# Patient Record
Sex: FEMALE | Race: WHITE | Employment: OTHER | URBAN - METROPOLITAN AREA
[De-identification: names, ages, dates, MRNs, and addresses within clinical notes are randomized per-mention and may not be internally consistent; named-entity substitution may affect disease eponyms.]

---

## 2018-03-29 ENCOUNTER — RECORDS - HEALTHEAST (OUTPATIENT)
Dept: LAB | Facility: CLINIC | Age: 83
End: 2018-03-29

## 2018-03-29 LAB
ALBUMIN UR-MCNC: NEGATIVE MG/DL
APPEARANCE UR: CLEAR
BACTERIA #/AREA URNS HPF: ABNORMAL HPF
BILIRUB UR QL STRIP: NEGATIVE
COLOR UR AUTO: YELLOW
GLUCOSE UR STRIP-MCNC: NEGATIVE MG/DL
HGB UR QL STRIP: ABNORMAL
KETONES UR STRIP-MCNC: NEGATIVE MG/DL
LEUKOCYTE ESTERASE UR QL STRIP: ABNORMAL
MUCOUS THREADS #/AREA URNS LPF: ABNORMAL LPF
NITRATE UR QL: NEGATIVE
PH UR STRIP: 5.5 [PH] (ref 4.5–8)
RBC #/AREA URNS AUTO: ABNORMAL HPF
SP GR UR STRIP: 1.01 (ref 1–1.03)
SQUAMOUS #/AREA URNS AUTO: ABNORMAL LPF
UROBILINOGEN UR STRIP-ACNC: ABNORMAL
WBC #/AREA URNS AUTO: ABNORMAL HPF

## 2018-03-30 LAB — BACTERIA SPEC CULT: NO GROWTH

## 2018-06-13 ENCOUNTER — RECORDS - HEALTHEAST (OUTPATIENT)
Dept: LAB | Facility: CLINIC | Age: 83
End: 2018-06-13

## 2018-06-13 LAB
25(OH)D3 SERPL-MCNC: 45.4 NG/ML (ref 30–80)
ANION GAP SERPL CALCULATED.3IONS-SCNC: 7 MMOL/L (ref 5–18)
BUN SERPL-MCNC: 17 MG/DL (ref 8–28)
CALCIUM SERPL-MCNC: 9.9 MG/DL (ref 8.5–10.5)
CHLORIDE BLD-SCNC: 104 MMOL/L (ref 98–107)
CO2 SERPL-SCNC: 27 MMOL/L (ref 22–31)
CREAT SERPL-MCNC: 0.93 MG/DL (ref 0.6–1.1)
ERYTHROCYTE [DISTWIDTH] IN BLOOD BY AUTOMATED COUNT: 12.5 % (ref 11–14.5)
GFR SERPL CREATININE-BSD FRML MDRD: 57 ML/MIN/1.73M2
GLUCOSE BLD-MCNC: 95 MG/DL (ref 70–125)
HCT VFR BLD AUTO: 36.1 % (ref 35–47)
HGB BLD-MCNC: 11.7 G/DL (ref 12–16)
MCH RBC QN AUTO: 33 PG (ref 27–34)
MCHC RBC AUTO-ENTMCNC: 32.4 G/DL (ref 32–36)
MCV RBC AUTO: 102 FL (ref 80–100)
PLATELET # BLD AUTO: 248 THOU/UL (ref 140–440)
PMV BLD AUTO: 9.7 FL (ref 8.5–12.5)
POTASSIUM BLD-SCNC: 4.4 MMOL/L (ref 3.5–5)
RBC # BLD AUTO: 3.55 MILL/UL (ref 3.8–5.4)
SODIUM SERPL-SCNC: 138 MMOL/L (ref 136–145)
TSH SERPL DL<=0.005 MIU/L-ACNC: 2.91 UIU/ML (ref 0.3–5)
WBC: 5.2 THOU/UL (ref 4–11)

## 2018-08-11 ENCOUNTER — APPOINTMENT (OUTPATIENT)
Dept: GENERAL RADIOLOGY | Facility: CLINIC | Age: 83
DRG: 200 | End: 2018-08-11
Attending: EMERGENCY MEDICINE
Payer: MEDICARE

## 2018-08-11 ENCOUNTER — APPOINTMENT (OUTPATIENT)
Dept: CT IMAGING | Facility: CLINIC | Age: 83
DRG: 200 | End: 2018-08-11
Attending: EMERGENCY MEDICINE
Payer: MEDICARE

## 2018-08-11 ENCOUNTER — HOSPITAL ENCOUNTER (INPATIENT)
Facility: CLINIC | Age: 83
LOS: 4 days | Discharge: HOME HEALTH | DRG: 200 | End: 2018-08-15
Attending: EMERGENCY MEDICINE | Admitting: SURGERY
Payer: MEDICARE

## 2018-08-11 DIAGNOSIS — S27.0XXA TRAUMATIC PNEUMOTHORAX, INITIAL ENCOUNTER: ICD-10-CM

## 2018-08-11 DIAGNOSIS — I10 ESSENTIAL HYPERTENSION: ICD-10-CM

## 2018-08-11 DIAGNOSIS — S22.41XA CLOSED FRACTURE OF MULTIPLE RIBS OF RIGHT SIDE, INITIAL ENCOUNTER: ICD-10-CM

## 2018-08-11 DIAGNOSIS — B02.8 HERPES ZOSTER DERMATITIS: Primary | ICD-10-CM

## 2018-08-11 DIAGNOSIS — L30.8 HERPES ZOSTER DERMATITIS: Primary | ICD-10-CM

## 2018-08-11 PROBLEM — J93.83 PNEUMOTHORAX, ACUTE: Status: ACTIVE | Noted: 2018-08-11

## 2018-08-11 LAB
ALBUMIN UR-MCNC: NEGATIVE MG/DL
ANION GAP SERPL CALCULATED.3IONS-SCNC: 6 MMOL/L (ref 3–14)
APPEARANCE UR: CLEAR
BACTERIA #/AREA URNS HPF: ABNORMAL /HPF
BASOPHILS # BLD AUTO: 0.1 10E9/L (ref 0–0.2)
BASOPHILS NFR BLD AUTO: 0.4 %
BILIRUB UR QL STRIP: NEGATIVE
BUN SERPL-MCNC: 21 MG/DL (ref 7–30)
CALCIUM SERPL-MCNC: 9 MG/DL (ref 8.5–10.1)
CHLORIDE SERPL-SCNC: 103 MMOL/L (ref 94–109)
CO2 SERPL-SCNC: 26 MMOL/L (ref 20–32)
COLOR UR AUTO: YELLOW
CREAT SERPL-MCNC: 0.96 MG/DL (ref 0.52–1.04)
DIFFERENTIAL METHOD BLD: ABNORMAL
EOSINOPHIL # BLD AUTO: 0.1 10E9/L (ref 0–0.7)
EOSINOPHIL NFR BLD AUTO: 0.4 %
ERYTHROCYTE [DISTWIDTH] IN BLOOD BY AUTOMATED COUNT: 12.5 % (ref 10–15)
GFR SERPL CREATININE-BSD FRML MDRD: 54 ML/MIN/1.7M2
GLUCOSE SERPL-MCNC: 107 MG/DL (ref 70–99)
GLUCOSE UR STRIP-MCNC: NEGATIVE MG/DL
HCT VFR BLD AUTO: 42.4 % (ref 35–47)
HGB BLD-MCNC: 13.5 G/DL (ref 11.7–15.7)
HGB UR QL STRIP: ABNORMAL
HYALINE CASTS #/AREA URNS LPF: 1 /LPF (ref 0–2)
IMM GRANULOCYTES # BLD: 0.1 10E9/L (ref 0–0.4)
IMM GRANULOCYTES NFR BLD: 0.6 %
INR PPP: 0.92 (ref 0.86–1.14)
KETONES UR STRIP-MCNC: NEGATIVE MG/DL
LEUKOCYTE ESTERASE UR QL STRIP: ABNORMAL
LYMPHOCYTES # BLD AUTO: 0.9 10E9/L (ref 0.8–5.3)
LYMPHOCYTES NFR BLD AUTO: 6.7 %
MCH RBC QN AUTO: 33.3 PG (ref 26.5–33)
MCHC RBC AUTO-ENTMCNC: 31.8 G/DL (ref 31.5–36.5)
MCV RBC AUTO: 105 FL (ref 78–100)
MONOCYTES # BLD AUTO: 0.7 10E9/L (ref 0–1.3)
MONOCYTES NFR BLD AUTO: 5.1 %
NEUTROPHILS # BLD AUTO: 11.6 10E9/L (ref 1.6–8.3)
NEUTROPHILS NFR BLD AUTO: 86.8 %
NITRATE UR QL: NEGATIVE
NRBC # BLD AUTO: 0 10*3/UL
NRBC BLD AUTO-RTO: 0 /100
PH UR STRIP: 6 PH (ref 5–7)
PLATELET # BLD AUTO: 265 10E9/L (ref 150–450)
POTASSIUM SERPL-SCNC: 4.1 MMOL/L (ref 3.4–5.3)
RBC # BLD AUTO: 4.05 10E12/L (ref 3.8–5.2)
RBC #/AREA URNS AUTO: 3 /HPF (ref 0–2)
SODIUM SERPL-SCNC: 135 MMOL/L (ref 133–144)
SOURCE: ABNORMAL
SP GR UR STRIP: 1.01 (ref 1–1.03)
SQUAMOUS #/AREA URNS AUTO: <1 /HPF (ref 0–1)
UROBILINOGEN UR STRIP-MCNC: 0 MG/DL (ref 0–2)
WBC # BLD AUTO: 13.4 10E9/L (ref 4–11)
WBC #/AREA URNS AUTO: 1 /HPF (ref 0–5)

## 2018-08-11 PROCEDURE — 40000275 ZZH STATISTIC RCP TIME EA 10 MIN

## 2018-08-11 PROCEDURE — 71046 X-RAY EXAM CHEST 2 VIEWS: CPT

## 2018-08-11 PROCEDURE — A9270 NON-COVERED ITEM OR SERVICE: HCPCS | Mod: GY | Performed by: SURGERY

## 2018-08-11 PROCEDURE — 40000965 ZZH STATISTIC END TITIAL CO2 MONITORING

## 2018-08-11 PROCEDURE — 80048 BASIC METABOLIC PNL TOTAL CA: CPT | Performed by: EMERGENCY MEDICINE

## 2018-08-11 PROCEDURE — 81001 URINALYSIS AUTO W/SCOPE: CPT | Performed by: INTERNAL MEDICINE

## 2018-08-11 PROCEDURE — 27210205 ZZH KIT THORACIC/CHEST DRAIN

## 2018-08-11 PROCEDURE — 96375 TX/PRO/DX INJ NEW DRUG ADDON: CPT

## 2018-08-11 PROCEDURE — 99221 1ST HOSP IP/OBS SF/LOW 40: CPT | Performed by: SURGERY

## 2018-08-11 PROCEDURE — 99153 MOD SED SAME PHYS/QHP EA: CPT

## 2018-08-11 PROCEDURE — 96374 THER/PROPH/DIAG INJ IV PUSH: CPT

## 2018-08-11 PROCEDURE — 70450 CT HEAD/BRAIN W/O DYE: CPT

## 2018-08-11 PROCEDURE — A9270 NON-COVERED ITEM OR SERVICE: HCPCS | Mod: GY | Performed by: INTERNAL MEDICINE

## 2018-08-11 PROCEDURE — 25000128 H RX IP 250 OP 636: Performed by: EMERGENCY MEDICINE

## 2018-08-11 PROCEDURE — 99285 EMERGENCY DEPT VISIT HI MDM: CPT | Mod: 25

## 2018-08-11 PROCEDURE — 0W9930Z DRAINAGE OF RIGHT PLEURAL CAVITY WITH DRAINAGE DEVICE, PERCUTANEOUS APPROACH: ICD-10-PCS | Performed by: EMERGENCY MEDICINE

## 2018-08-11 PROCEDURE — 71250 CT THORAX DX C-: CPT

## 2018-08-11 PROCEDURE — 12000000 ZZH R&B MED SURG/OB

## 2018-08-11 PROCEDURE — 85610 PROTHROMBIN TIME: CPT | Performed by: EMERGENCY MEDICINE

## 2018-08-11 PROCEDURE — 72125 CT NECK SPINE W/O DYE: CPT

## 2018-08-11 PROCEDURE — 40000986 XR CHEST PORT 1 VW

## 2018-08-11 PROCEDURE — 85025 COMPLETE CBC W/AUTO DIFF WBC: CPT | Performed by: EMERGENCY MEDICINE

## 2018-08-11 PROCEDURE — 25000132 ZZH RX MED GY IP 250 OP 250 PS 637: Mod: GY | Performed by: SURGERY

## 2018-08-11 PROCEDURE — 99221 1ST HOSP IP/OBS SF/LOW 40: CPT | Performed by: INTERNAL MEDICINE

## 2018-08-11 PROCEDURE — 40000914 ZZH STATISTIC SITTER, DAY HOURS

## 2018-08-11 PROCEDURE — 40000916 ZZH STATISTIC SITTER, NIGHT HOURS

## 2018-08-11 PROCEDURE — 32551 INSERTION OF CHEST TUBE: CPT

## 2018-08-11 PROCEDURE — 99207 ZZC CONSULT E&M CHANGED TO INITIAL LEVEL: CPT | Performed by: INTERNAL MEDICINE

## 2018-08-11 PROCEDURE — 25000132 ZZH RX MED GY IP 250 OP 250 PS 637: Mod: GY | Performed by: INTERNAL MEDICINE

## 2018-08-11 PROCEDURE — 72128 CT CHEST SPINE W/O DYE: CPT

## 2018-08-11 PROCEDURE — 96376 TX/PRO/DX INJ SAME DRUG ADON: CPT

## 2018-08-11 PROCEDURE — 40000915 ZZH STATISTIC SITTER, EVENING HOURS

## 2018-08-11 PROCEDURE — 99152 MOD SED SAME PHYS/QHP 5/>YRS: CPT

## 2018-08-11 RX ORDER — LOSARTAN POTASSIUM 50 MG/1
50 TABLET ORAL DAILY
Status: DISCONTINUED | OUTPATIENT
Start: 2018-08-11 | End: 2018-08-15 | Stop reason: HOSPADM

## 2018-08-11 RX ORDER — HALOPERIDOL 5 MG/ML
2 INJECTION INTRAMUSCULAR ONCE
Status: COMPLETED | OUTPATIENT
Start: 2018-08-11 | End: 2018-08-11

## 2018-08-11 RX ORDER — POLYETHYLENE GLYCOL 3350 17 G/17G
17 POWDER, FOR SOLUTION ORAL DAILY
Status: DISCONTINUED | OUTPATIENT
Start: 2018-08-11 | End: 2018-08-15 | Stop reason: HOSPADM

## 2018-08-11 RX ORDER — BISACODYL 5 MG
5 TABLET, DELAYED RELEASE (ENTERIC COATED) ORAL DAILY PRN
Status: DISCONTINUED | OUTPATIENT
Start: 2018-08-11 | End: 2018-08-15 | Stop reason: HOSPADM

## 2018-08-11 RX ORDER — BISACODYL 5 MG
10 TABLET, DELAYED RELEASE (ENTERIC COATED) ORAL DAILY PRN
Status: DISCONTINUED | OUTPATIENT
Start: 2018-08-11 | End: 2018-08-15 | Stop reason: HOSPADM

## 2018-08-11 RX ORDER — PROCHLORPERAZINE 25 MG
12.5 SUPPOSITORY, RECTAL RECTAL EVERY 12 HOURS PRN
Status: DISCONTINUED | OUTPATIENT
Start: 2018-08-11 | End: 2018-08-15 | Stop reason: HOSPADM

## 2018-08-11 RX ORDER — ONDANSETRON 2 MG/ML
4 INJECTION INTRAMUSCULAR; INTRAVENOUS ONCE
Status: COMPLETED | OUTPATIENT
Start: 2018-08-11 | End: 2018-08-11

## 2018-08-11 RX ORDER — POTASSIUM CHLORIDE 1500 MG/1
20-40 TABLET, EXTENDED RELEASE ORAL
Status: DISCONTINUED | OUTPATIENT
Start: 2018-08-11 | End: 2018-08-15 | Stop reason: HOSPADM

## 2018-08-11 RX ORDER — POLYETHYLENE GLYCOL 3350 17 G/17G
17 POWDER, FOR SOLUTION ORAL DAILY PRN
Status: DISCONTINUED | OUTPATIENT
Start: 2018-08-11 | End: 2018-08-15 | Stop reason: HOSPADM

## 2018-08-11 RX ORDER — PROCHLORPERAZINE MALEATE 5 MG
5 TABLET ORAL EVERY 6 HOURS PRN
Status: DISCONTINUED | OUTPATIENT
Start: 2018-08-11 | End: 2018-08-15 | Stop reason: HOSPADM

## 2018-08-11 RX ORDER — OXYCODONE HYDROCHLORIDE 5 MG/1
5-10 TABLET ORAL
Status: DISCONTINUED | OUTPATIENT
Start: 2018-08-11 | End: 2018-08-15 | Stop reason: HOSPADM

## 2018-08-11 RX ORDER — MORPHINE SULFATE 4 MG/ML
4 INJECTION, SOLUTION INTRAMUSCULAR; INTRAVENOUS ONCE
Status: COMPLETED | OUTPATIENT
Start: 2018-08-11 | End: 2018-08-11

## 2018-08-11 RX ORDER — SERTRALINE HYDROCHLORIDE 100 MG/1
100 TABLET, FILM COATED ORAL DAILY
Status: DISCONTINUED | OUTPATIENT
Start: 2018-08-11 | End: 2018-08-15 | Stop reason: HOSPADM

## 2018-08-11 RX ORDER — ALUMINA, MAGNESIA, AND SIMETHICONE 2400; 2400; 240 MG/30ML; MG/30ML; MG/30ML
30 SUSPENSION ORAL EVERY 4 HOURS PRN
Status: DISCONTINUED | OUTPATIENT
Start: 2018-08-11 | End: 2018-08-15 | Stop reason: HOSPADM

## 2018-08-11 RX ORDER — LIDOCAINE 40 MG/G
CREAM TOPICAL
Status: DISCONTINUED | OUTPATIENT
Start: 2018-08-11 | End: 2018-08-15 | Stop reason: HOSPADM

## 2018-08-11 RX ORDER — LEVOTHYROXINE SODIUM 75 UG/1
75 TABLET ORAL DAILY
COMMUNITY

## 2018-08-11 RX ORDER — NALOXONE HYDROCHLORIDE 0.4 MG/ML
.1-.4 INJECTION, SOLUTION INTRAMUSCULAR; INTRAVENOUS; SUBCUTANEOUS
Status: DISCONTINUED | OUTPATIENT
Start: 2018-08-11 | End: 2018-08-15 | Stop reason: HOSPADM

## 2018-08-11 RX ORDER — MAGNESIUM SULFATE HEPTAHYDRATE 40 MG/ML
4 INJECTION, SOLUTION INTRAVENOUS EVERY 4 HOURS PRN
Status: DISCONTINUED | OUTPATIENT
Start: 2018-08-11 | End: 2018-08-15 | Stop reason: HOSPADM

## 2018-08-11 RX ORDER — LANOLIN ALCOHOL/MO/W.PET/CERES
CREAM (GRAM) TOPICAL 2 TIMES DAILY
COMMUNITY

## 2018-08-11 RX ORDER — SERTRALINE HYDROCHLORIDE 100 MG/1
100 TABLET, FILM COATED ORAL DAILY
COMMUNITY

## 2018-08-11 RX ORDER — POLYETHYLENE GLYCOL 3350 17 G/17G
1 POWDER, FOR SOLUTION ORAL DAILY
COMMUNITY

## 2018-08-11 RX ORDER — ONDANSETRON 4 MG/1
4 TABLET, ORALLY DISINTEGRATING ORAL EVERY 6 HOURS PRN
Status: DISCONTINUED | OUTPATIENT
Start: 2018-08-11 | End: 2018-08-15 | Stop reason: HOSPADM

## 2018-08-11 RX ORDER — CHOLECALCIFEROL (VITAMIN D3) 50 MCG
1 TABLET ORAL DAILY
COMMUNITY

## 2018-08-11 RX ORDER — OLOPATADINE HYDROCHLORIDE 2 MG/ML
1 SOLUTION/ DROPS OPHTHALMIC PRN
COMMUNITY

## 2018-08-11 RX ORDER — AMOXICILLIN 250 MG
2 CAPSULE ORAL 2 TIMES DAILY
Status: DISCONTINUED | OUTPATIENT
Start: 2018-08-11 | End: 2018-08-15 | Stop reason: HOSPADM

## 2018-08-11 RX ORDER — POTASSIUM CHLORIDE 1.5 G/1.58G
20-40 POWDER, FOR SOLUTION ORAL
Status: DISCONTINUED | OUTPATIENT
Start: 2018-08-11 | End: 2018-08-15 | Stop reason: HOSPADM

## 2018-08-11 RX ORDER — ACETAMINOPHEN 325 MG/1
650 TABLET ORAL EVERY 4 HOURS PRN
Status: DISCONTINUED | OUTPATIENT
Start: 2018-08-11 | End: 2018-08-15 | Stop reason: HOSPADM

## 2018-08-11 RX ORDER — LEVOTHYROXINE SODIUM 75 UG/1
75 TABLET ORAL DAILY
Status: DISCONTINUED | OUTPATIENT
Start: 2018-08-11 | End: 2018-08-15 | Stop reason: HOSPADM

## 2018-08-11 RX ORDER — MORPHINE SULFATE 4 MG/ML
4 INJECTION, SOLUTION INTRAMUSCULAR; INTRAVENOUS
Status: COMPLETED | OUTPATIENT
Start: 2018-08-11 | End: 2018-08-11

## 2018-08-11 RX ORDER — POTASSIUM CHLORIDE 7.45 MG/ML
10 INJECTION INTRAVENOUS
Status: DISCONTINUED | OUTPATIENT
Start: 2018-08-11 | End: 2018-08-15 | Stop reason: HOSPADM

## 2018-08-11 RX ORDER — POLYETHYLENE GLYCOL 3350 17 G/17G
1 POWDER, FOR SOLUTION ORAL PRN
COMMUNITY

## 2018-08-11 RX ORDER — LIDOCAINE HYDROCHLORIDE 20 MG/ML
INJECTION, SOLUTION INFILTRATION; PERINEURAL
Status: DISCONTINUED
Start: 2018-08-11 | End: 2018-08-11 | Stop reason: HOSPADM

## 2018-08-11 RX ORDER — GUAIFENESIN AND DEXTROMETHORPHAN HYDROBROMIDE 100; 10 MG/5ML; MG/5ML
10 SOLUTION ORAL EVERY 4 HOURS PRN
COMMUNITY

## 2018-08-11 RX ORDER — POTASSIUM CHLORIDE 29.8 MG/ML
20 INJECTION INTRAVENOUS
Status: DISCONTINUED | OUTPATIENT
Start: 2018-08-11 | End: 2018-08-11

## 2018-08-11 RX ORDER — ONDANSETRON 2 MG/ML
4 INJECTION INTRAMUSCULAR; INTRAVENOUS EVERY 6 HOURS PRN
Status: DISCONTINUED | OUTPATIENT
Start: 2018-08-11 | End: 2018-08-15 | Stop reason: HOSPADM

## 2018-08-11 RX ORDER — AMOXICILLIN 250 MG
1 CAPSULE ORAL 2 TIMES DAILY
Status: DISCONTINUED | OUTPATIENT
Start: 2018-08-11 | End: 2018-08-15 | Stop reason: HOSPADM

## 2018-08-11 RX ORDER — POTASSIUM CL/LIDO/0.9 % NACL 10MEQ/0.1L
10 INTRAVENOUS SOLUTION, PIGGYBACK (ML) INTRAVENOUS
Status: DISCONTINUED | OUTPATIENT
Start: 2018-08-11 | End: 2018-08-15 | Stop reason: HOSPADM

## 2018-08-11 RX ORDER — BISACODYL 5 MG
15 TABLET, DELAYED RELEASE (ENTERIC COATED) ORAL DAILY PRN
Status: DISCONTINUED | OUTPATIENT
Start: 2018-08-11 | End: 2018-08-15 | Stop reason: HOSPADM

## 2018-08-11 RX ORDER — ACETAMINOPHEN 325 MG/1
650 TABLET ORAL 2 TIMES DAILY PRN
COMMUNITY

## 2018-08-11 RX ORDER — BISACODYL 10 MG
10 SUPPOSITORY, RECTAL RECTAL DAILY PRN
Status: DISCONTINUED | OUTPATIENT
Start: 2018-08-11 | End: 2018-08-15 | Stop reason: HOSPADM

## 2018-08-11 RX ORDER — HYDROMORPHONE HYDROCHLORIDE 1 MG/ML
0.2 INJECTION, SOLUTION INTRAMUSCULAR; INTRAVENOUS; SUBCUTANEOUS
Status: DISCONTINUED | OUTPATIENT
Start: 2018-08-11 | End: 2018-08-15 | Stop reason: HOSPADM

## 2018-08-11 RX ORDER — TRIAMCINOLONE ACETONIDE 1 MG/G
CREAM TOPICAL 2 TIMES DAILY PRN
COMMUNITY

## 2018-08-11 RX ADMIN — ONDANSETRON 4 MG: 2 INJECTION, SOLUTION INTRAMUSCULAR; INTRAVENOUS at 07:57

## 2018-08-11 RX ADMIN — POLYETHYLENE GLYCOL 3350 17 G: 17 POWDER, FOR SOLUTION ORAL at 16:56

## 2018-08-11 RX ADMIN — LOSARTAN POTASSIUM 50 MG: 50 TABLET ORAL at 17:04

## 2018-08-11 RX ADMIN — MORPHINE SULFATE 4 MG: 4 INJECTION INTRAVENOUS at 10:14

## 2018-08-11 RX ADMIN — SERTRALINE HYDROCHLORIDE 100 MG: 100 TABLET ORAL at 16:56

## 2018-08-11 RX ADMIN — HALOPERIDOL LACTATE 2 MG: 5 INJECTION INTRAMUSCULAR at 10:14

## 2018-08-11 RX ADMIN — ACETAMINOPHEN 650 MG: 325 TABLET, FILM COATED ORAL at 21:21

## 2018-08-11 RX ADMIN — MORPHINE SULFATE 4 MG: 4 INJECTION INTRAVENOUS at 11:02

## 2018-08-11 RX ADMIN — SODIUM CHLORIDE 1000 ML: 9 INJECTION, SOLUTION INTRAVENOUS at 08:00

## 2018-08-11 RX ADMIN — MORPHINE SULFATE 4 MG: 4 INJECTION INTRAVENOUS at 11:30

## 2018-08-11 RX ADMIN — ACETAMINOPHEN 650 MG: 325 TABLET, FILM COATED ORAL at 16:55

## 2018-08-11 RX ADMIN — SENNOSIDES AND DOCUSATE SODIUM 2 TABLET: 8.6; 5 TABLET ORAL at 21:21

## 2018-08-11 RX ADMIN — SENNOSIDES AND DOCUSATE SODIUM 1 TABLET: 8.6; 5 TABLET ORAL at 14:15

## 2018-08-11 RX ADMIN — MORPHINE SULFATE 4 MG: 4 INJECTION INTRAVENOUS at 09:39

## 2018-08-11 RX ADMIN — LEVOTHYROXINE SODIUM 75 MCG: 75 TABLET ORAL at 16:55

## 2018-08-11 ASSESSMENT — ENCOUNTER SYMPTOMS
ABDOMINAL PAIN: 0
BACK PAIN: 1
SHORTNESS OF BREATH: 1
MYALGIAS: 0

## 2018-08-11 ASSESSMENT — ACTIVITIES OF DAILY LIVING (ADL)
ADLS_ACUITY_SCORE: 20
ADLS_ACUITY_SCORE: 13

## 2018-08-11 ASSESSMENT — PAIN DESCRIPTION - DESCRIPTORS: DESCRIPTORS: SHARP

## 2018-08-11 NOTE — PLAN OF CARE
Problem: ARDS (Acute Resp Distress Syndrome) (Adult)  Goal: Signs and Symptoms of Listed Potential Problems Will be Absent, Minimized or Managed (ARDS)  Signs and symptoms of listed potential problems will be absent, minimized or managed by discharge/transition of care (reference ARDS (Acute Resp Distress Syndrome) (Adult) CPG).   Outcome: No Change  Ambulatory Status:  Pt up assist of two, gait belt, and the lele steady.  VS:  T96.4, P 61, /41, R 20, O2 96 2LPM nasal canula.   Pain:  3/10 chest pain.  Resp: LS clear and equal bilateraly.  GI:  Denies nausea.  Fair appetite and on reg diet.  BS audible and active throughout.  Passing flatus.  Last BM pt does not remember.  :  incont of urin.  Skin:  R chest tube, leaking serosanguinous fluid around sight. Thoracic aware.  IV SL.   Labs:  Pot 4.1, glucose 107, WBC 13.4  Consults:  OT, PT, SW, Thoracic surgery.   Disposition:  Will continue to monitor.

## 2018-08-11 NOTE — PROGRESS NOTES
RT note; pt monitored during conscious sedation for insertion of chest tube Right chest.  SPO2 maintained 99%-100% with nasal cannula at 4 lpm and omni mask at 6 lpm, ETCO2 11-36, RR 11-23, HR 66-77. Ambu bag, suction, oral airway at bedside. Pt tolerated well and was conversing appropriately upon completion of procedure. No Problems.

## 2018-08-11 NOTE — ED NOTES
"[facility]  ED Nurse Handoff Report    Coty Trotter is a 89 year old female   ED Chief complaint: Fall  . ED Diagnosis:   Final diagnoses:   Closed fracture of multiple ribs of right side, initial encounter   Traumatic pneumothorax, initial encounter     Allergies:   Allergies   Allergen Reactions     Sulfa Drugs        Code Status: Full Code  Activity level - Baseline/Home:  Stand with Assist of 2. Activity Level - Current:   Stand with Assist of 2. Lift room needed: No. Bariatric: No   Needed: No   Isolation: No. Infection: Not Applicable.     Vital Signs:   Vitals:    08/11/18 0537 08/11/18 0743 08/11/18 0801 08/11/18 0900   BP: 197/79 179/89  (!) 209/101   Pulse:       Resp:  22  15   Temp:       TempSrc:       SpO2:  99%  95%   Weight:   54.4 kg (120 lb)        Cardiac Rhythm:  ,      Pain level:    Patient confused: Yes. Patient Falls Risk: Yes.   Elimination Status: Unable to void and patient has not voided since admission to ED   Patient Report - Initial Complaint: Patient fell at nursing home while using walker and was brought to emergency room via EMS. Focused Assessment: 28 Israeli chest tube placed on R side, after moderate pneumothorax noted to include fractured rib, patient tolerating well but confused A&OX1, daughter contacted but did not come in due to her presence makes her mother irrigated \"daughter stated.\"  Tests Performed: labs, xray, CT head & neck. Abnormal Results:   Chest  XR, 1 view PORTABLE   Final Result   IMPRESSION:  Placement of a right chest tube. Decreased right   pneumothorax, currently small.      JONG MARRERO MD      Chest XR,  PA & LAT   Final Result   IMPRESSION: Mild to moderate right apical pneumothorax. The left lung   appears clear. No pleural effusions appreciated. Mild cardiomegaly.      KURT ESPARZA MD      CT Thoracic Spine w/o Contrast   Final Result   IMPRESSION:    1. Degenerative change in the thoracic spine without acute thoracic "   spine abnormality.   2. Nondisplaced fractures of the right posterior fourth and fifth ribs   with moderate right pneumothorax, better visualized on dedicated chest   CT.      MYAH DAVID MD      CT Chest w/o Contrast   Final Result   IMPRESSION: Nondisplaced fractures of the right posterior fourth and   fifth ribs with associated moderate right pneumothorax.      MYAH DAVID MD      Cervical spine CT w/o contrast   Final Result   IMPRESSION: No acute abnormality.      MYAH DAVID MD      Head CT w/o contrast   Final Result   IMPRESSION: No acute abnormality.      MYAH DAVID MD        Labs Ordered and Resulted from Time of ED Arrival Up to the Time of Departure from the ED   CBC WITH PLATELETS DIFFERENTIAL - Abnormal; Notable for the following:        Result Value    WBC 13.4 (*)      (*)     MCH 33.3 (*)     Absolute Neutrophil 11.6 (*)     All other components within normal limits   BASIC METABOLIC PANEL - Abnormal; Notable for the following:     Glucose 107 (*)     GFR Estimate 54 (*)     All other components within normal limits   INR   PERIPHERAL IV CATHETER   IV ACCESS   .   Treatments provided: 28 Korean chest tube placed 4th ICS MCL w/suction  Family Comments:   OBS brochure/video discussed/provided to patient:  No  ED Medications:   Medications   ketamine (5 mg/mL)-propofol (5 mg/mL) 1:1 for injection (not administered)   lidocaine 2 % injection (not administered)   morphine (PF) injection 4 mg (not administered)   0.9% sodium chloride BOLUS (0 mLs Intravenous Stopped 8/11/18 0910)   ondansetron (ZOFRAN) injection 4 mg (4 mg Intravenous Given 8/11/18 5047)     Drips infusing:  No  For the majority of the shift, the patient's behavior Yellow. Interventions performed were 28 Korean chest tube placed 4th ICS MCL w/suction.     Severe Sepsis OR Septic Shock Diagnosis Present: No      ED Nurse Name/Phone Number: James Howard,   9:35 AM  RECEIVING UNIT ED HANDOFF REVIEW    Above ED Nurse  Handoff Report was reviewed: YES  Reviewed by: Ledy Cason on August 11, 2018 at 11:49 AM

## 2018-08-11 NOTE — IP AVS SNAPSHOT
"          [place] [address] MEDICAL SURGICAL: [phone]                                              INTERAGENCY TRANSFER FORM - LAB / IMAGING / EKG / EMG RESULTS   2018                    Hospital Admission Date: 2018  GORDY HAMMER   : 1921  Sex: Female        Attending Provider: Debi Medel MD     Allergies:  Sulfa Drugs    Infection:  None   Service:  GENERAL MEDI    Ht:  1.626 m (5' 4\")   Wt:  80.8 kg (178 lb 1.6 oz)   Admission Wt:  54.4 kg (120 lb)    BMI:  30.57 kg/m 2   BSA:  1.91 m 2            Patient PCP Information     Provider PCP Type    Ceasar Churchill MD General         Lab Results - 3 Days      CBC with platelets [[id number]] (Abnormal)  Resulted: 18 0742, Result status: Final result    Ordering provider: Alfred Alicea MD  18 0000 Resulting lab: [facility]    Specimen Information    Type Source Collected On   Blood  18 0714          Components       Value Reference Range Flag Lab   WBC 7.6 4.0 - 11.0 10e9/L  FrRdHs   RBC Count 3.46 3.8 - 5.2 10e12/L L FrRdHs   Hemoglobin 11.3 11.7 - 15.7 g/dL L FrRdHs   Hematocrit 35.6 35.0 - 47.0 %  FrRd    78 - 100 fl H FrRd   MCH 32.7 26.5 - 33.0 pg  FrRd   MCHC 31.7 31.5 - 36.5 g/dL  FrRd   RDW 12.4 10.0 - 15.0 %  FrRdHs   Platelet Count 236 150 - 450 10e9/L  FrRdHs            Testing Performed By     Lab - Abbreviation Name Director Address Valid Date Range    12 - FrRdHs [facility] Unknown [address] [facility] [address] 05/08/15 1057 - Present            Unresulted Labs (24h ago through future)    Start       Ordered    Unscheduled  Potassium  (Potassium Replacement - \"Standard\" - For K levels less than 3.4 mmol/L - UU,UR,UA,RH,SH,PH,WY )  CONDITIONAL (SPECIFY),   Routine     Comments:  Obtain Potassium Level for these conditions:  *IF no potassium result within 24 hours before initiation of order set, draw potassium level with next lab collect.  " "  *2 HOURS AFTER last IV potassium replacement dose and 4 hours after an oral replacement dose.  *Next morning after potassium dose.     Repeat Potassium Replacement if necessary.    08/11/18 1225    Unscheduled  Magnesium  (Magnesium Replacement -  Adult - \"Standard\" - Replacement for all levels less than 1.6 mg/dL )  CONDITIONAL (SPECIFY),   Routine     Comments:  Obtain Magnesium Level for these conditions:  *IF no magnesium result within 24 hrs before initiation of order set, draw magnesium level with next lab collect.    *2 HOURS AFTER last magnesium replacement dose when magnesium replacement given for level less than 1.6   *Next morning after magnesium dose.     Repeat Magnesium Replacement if necessary.    08/11/18 1225         Imaging Results - 3 Days      XR Chest 2 Views [[id number]]  Resulted: 08/13/18 1115, Result status: Final result    Ordering provider: Johnnie Jacome MD  08/13/18 0856 Resulted by: Myah David MD    Performed: 08/13/18 1040 - 08/13/18 1048 Resulting lab: RADIOLOGY RESULTS    Narrative:       XR CHEST 2 VW 8/13/2018 10:48 AM    HISTORY: Pneumothorax.    COMPARISON: 8/12/2018    FINDINGS: Trace right apical pneumothorax. Subacute right-sided rib  fractures. Mild bilateral basilar atelectasis. Stable heart size.      Impression:       IMPRESSION: Trace right apical pneumothorax, decreased in volume from  yesterday.    MYAH DAVID MD      XR Chest 2 Views [[id number]]  Resulted: 08/12/18 1133, Result status: Final result    Ordering provider: Johnnie Jacome MD  08/12/18 1057 Resulted by: Brandan Phelan MD    Performed: 08/12/18 1111 - 08/12/18 1123 Resulting lab: RADIOLOGY RESULTS    Narrative:       XR CHEST 2 VW 8/12/2018 11:23 AM    HISTORY: Pneumothorax.    COMPARISON: August 12, 2018.      Impression:       IMPRESSION: Interval removal of right chest tube. There is a small  right apical pneumothorax measuring approximately 1.3 cm to the lung  apex. Streaky atelectasis or " scarring of the left lung base persists,  with likely small left effusion.    BRANDAN ESPARZA MD      XR Chest 2 Views [[id number]]  Resulted: 08/12/18 0725, Result status: Final result    Ordering provider: Johnnie Jacome MD  08/12/18 0005 Resulted by: Brandan Esparza MD    Performed: 08/12/18 0652 - 08/12/18 0705 Resulting lab: RADIOLOGY RESULTS    Narrative:       XR CHEST 2 VW 8/12/2018 7:05 AM    HISTORY: Rib fractures.    COMPARISON: August 11, 2018.      Impression:       IMPRESSION: Right chest tube in place as before, with bibasilar  atelectasis, left greater than right. The previously noted right  pneumothorax is not well appreciated on today's examination. Stable  mild cardiomegaly.    BRANDAN ESPARZA MD      Testing Performed By     Lab - Abbreviation Name Director Address Valid Date Range    104 - Rad Rslts RADIOLOGY RESULTS Unknown Unknown 02/16/05 1553 - Present            Encounter-Level Documents:     There are no encounter-level documents.      Order-Level Documents:     There are no order-level documents.

## 2018-08-11 NOTE — ED TRIAGE NOTES
Pt presents from a fall tripped going back to bed. Pt does have dementia now having some right rib pain. No neck pain noted. C-collar in place via EMS.

## 2018-08-11 NOTE — CONSULTS
SWS aware of consult. Will continue to follow pt and chart as medical POC is developed and discharge needs are clearer.  PT/OT also consulted.

## 2018-08-11 NOTE — PHARMACY-ADMISSION MEDICATION HISTORY
Admission medication history interview status for this patient is complete. See [facility] admission navigator for allergy information, prior to admission medications and immunization status.     Medication history interview source(s): MAR from [facility]  Medication history resources (including written lists, pill bottles, clinic record): None  Primary pharmacy:     Changes made to PTA medication list:  Added:  All meds  Deleted:  None  Changed:  None    Actions taken by pharmacist (provider contacted, etc):None     Additional medication history information:None    Medication reconciliation/reorder completed by provider prior to medication history? No     Do you take OTC medications (eg tylenol, ibuprofen, fish oil, eye/ear drops, etc)?  Yes, as listed    For patients on insulin therapy: No      Prior to Admission medications    Medication Sig Last Dose Taking? Auth Provider   acetaminophen (TYLENOL) 325 MG tablet Take 650 mg by mouth 2 times daily as needed for mild pain 8/10/2018 at hs Yes Unknown, Entered By History   carboxymethylcellulose (REFRESH) 1 % ophthalmic solution Place 3 drops into both eyes 2 times daily as needed for dry eyes prn Yes Unknown, Entered By History   Cholecalciferol (VITAMIN D) 2000 units tablet Take 1 tablet by mouth daily 8/10/2018 at am Yes Unknown, Entered By History   conjugated estrogens (PREMARIN) cream Place vaginally as needed prn Yes Unknown, Entered By History   Dextromethorphan-guaiFENesin  MG/5ML syrup Take 10 mLs by mouth every 4 hours as needed for cough prn Yes Unknown, Entered By History   Foot Care Products (RA BUNION CUSHION) PADS Apply topically as needed prn Yes Unknown, Entered By History   levothyroxine (SYNTHROID) 75 MCG tablet Take 75 mcg by mouth daily 8/10/2018 at hs Yes Unknown, Entered By History   mineral oil-white petrolatum (MINERIN/EUCERIN) CREA cream Apply topically 2 times daily , apply to lower legs until resolved for dry skin  8/10/2018 at hs Yes Unknown, Entered By History   olopatadine HCl (PATADAY) 0.2 % SOLN Place 1 drop into both eyes as needed prn Yes Unknown, Entered By History   polyethylene glycol (MIRALAX/GLYCOLAX) Packet Take 1 packet by mouth daily 8/10/2018 at hs Yes Unknown, Entered By History   polyethylene glycol (MIRALAX/GLYCOLAX) Packet Take 1 packet by mouth as needed for constipation prn Yes Unknown, Entered By History   sertraline (ZOLOFT) 100 MG tablet Take 100 mg by mouth daily 8/10/2018 at am Yes Unknown, Entered By History   triamcinolone (KENALOG) 0.1 % cream Apply topically 2 times daily as needed for irritation , apply to rash on face, legs & back prn Yes Unknown, Entered By History

## 2018-08-11 NOTE — ED NOTES
Pt's daughter Jaqueline updated on pt's status. Daughter states she will not be coming in because the pt gets more worked up when she is around. Pt's daughter requesting update later in day.

## 2018-08-11 NOTE — ED PROVIDER NOTES
History     Chief Complaint:  Fall    HPI   HPI limited secondary to patient's dementia.    Coty Trotter is a 89 year old female, with a history of dementia, who presents to the emergency department for evaluation of a fall. The patient reports she fell when she tripped. She reports she might have lost consciousness. The patient reports she has a hard time breathing and has pain with breathing. She denies any abdominal pain. Per nurses note, the patient fell when she tripped going back to bed and is now experiencing some right rib pain.    Allergies:  Sulfa drugs      Medications:    The patient is not currently taking any prescribed medications.    Past Medical History:    Dementia    Past Surgical History:    History reviewed. No pertinent surgical history.    Family History:    History reviewed. No pertinent family history.     Social History:  The patient presents to the emergency department by herself.  Marital Status:  Single [1]     Review of Systems   Respiratory: Positive for shortness of breath.    Cardiovascular: Positive for chest pain.   Gastrointestinal: Negative for abdominal pain.   Musculoskeletal: Positive for back pain. Negative for myalgias.   All other systems reviewed and are negative.    Physical Exam   Patient Vitals for the past 24 hrs:   BP Temp Temp src Pulse Heart Rate Resp SpO2   08/11/18 0537 197/79 - - - - - -   08/11/18 0535 - 97.8  F (36.6  C) Oral 72 72 18 96 %     Physical Exam  Vital signs reviewed.  Nursing note reviewed.  Constituional: Well-developed, Well-nourished.  Non-diaphoretic.  Pleasant  HENT: No evidence of head trauma.  No pain or instability to palpation of bony orbits, mandible, maxilla.  Good jaw opening.  No malocclusion.  No nasal septal hematoma.  OP clear and moist.    EYES:  PERRL.  EOMI.  NECK:  C collar in place. No Cspine tenderness.  Trachea midline.  Supple. No stridor.  CARDIAC:  RRR.  Nl s1, s2.  No murmurs.    CHEST:  No external evidence of  chest trauma, R sided thoracic tenderness, mild splinting  PULM: Effort  Normal.  Breath sounds clear and equal  ABD:  Soft, NT/ND, normal BS.  No guarding, no rebound.  No external evidence of abdominal trauma  : No CVA T.    BACK: T-spine tenderness, no L spinous process tenderness.  Pelvis stable.  RECTAL: deferred  EXT:  Full ROM X4.  No tenderness.  No edema.    NEURO:  Alert, somewhat disoriented (baseline).  5/5 UE and LE, proximal and distal.  Sensation intact to LT.   SKIN :  Warm.  Dry.   No erythema.  No rash  PSYCH.:  Normal judgment.  Normal affect.        Emergency Department Course   Imaging:  Radiographic findings were communicated with the patient who voiced understanding of the findings.    CT Thoracic Spine w/o Contrast  IMPRESSION:   1. Degenerative change in the thoracic spine without acute thoracic  spine abnormality.  2. Nondisplaced fractures of the right posterior fourth and fifth ribs  with moderate right pneumothorax, better visualized on dedicated chest  CT  As read by Radiology.    CT Chest w/o Contrast  IMPRESSION: Nondisplaced fractures of the right posterior fourth and  fifth ribs with associated moderate right pneumothorax.  As read by Radiology.    Head CT w/o contrast  IMPRESSION: No acute abnormality.  As read by Radiology.    Cervical spine CT w/o contrast  IMPRESSION: No acute abnormality.  As read by Radiology.    Procedures:  [facility] Procedure Note        Sedation:      Performed by: Juan Baeza  Authorized by: Juan Baeza    Pre-Procedure Assessment done at 0630.    Expected Level:  Moderate Sedation    Indication:  Sedation is required to allow for Chest Tube    Consent obtained from patient's daughter after discussing the risks, benefits and alternatives.    PO Intake:  Not NPO but emergent condition outweighs risk.    ASA Class:  Class 2 - MILD SYSTEMIC DISEASE, NO ACUTE PROBLEMS, NO FUNCTIONAL LIMITATIONS.    Mallampati:  Grade 1:  Soft  palate, uvula, tonsillar pillars, and posterior pharyngeal wall visible    Lungs: Lungs Clear with good breath sounds bilaterally.     Heart: Normal heart sounds and rate    History and physical reviewed and no updates needed. I have reviewed the lab findings, diagnostic data, medications, and the plan for sedation. I have determined this patient to be an appropriate candidate for the planned sedation and procedure and have reassessed the patient IMMEDIATELY PRIOR to sedation and procedure.      Sedation Post Procedure Summary:    Prior to the start of the procedure and with procedural staff participation, I verbally confirmed the patient s identity using two indicators, relevant allergies, that the procedure was appropriate and matched the consent or emergent situation, and that the correct equipment/implants were available. Immediately prior to starting the procedure I conducted the Time Out with the procedural staff and re-confirmed the patient s name, procedure, and site/side. (The Joint Commission universal protocol was followed.)  Yes      Sedatives: Propofol and Ketamine    Vital signs, airway, End Tidal CO2 and pulse oximetry were monitored and remained stable throughout the procedure and sedation was maintained until the procedure was complete.  The patient was monitored by staff until sedation discharge criteria were met.    Patient tolerance: Patient tolerated the procedure well with no immediate complications.    Time of sedation in minutes:  25 minutes from beginning to end of physician one to one monitoring.        Procedure:  Tube Thoracostomy.    Indication:    Consent:  Risks, benefits and alternatives were discussed with Jaqueline Pineda, the patient's daughter, (who) and consent for  procedure was obtained.    Timeout:  Universal protocol was followed. TIME OUT conducted just prior to starting  procedure confirmed patient identity, site/side, procedure, patient position, and availability of  correct  equipment and implants.? Yes  Medication:    Procedure Note:  Patient was placed in a semirecumbent position with the head of the bed  at 30 degrees. The right side was prepped with chlorhexidine. Patient was medicated as  above.    Incision was made laterally at the mid-axillary line. Blunt dissection up and over the rib was preformed until access was obtained to the pleural cavity. A 28 Wolof chest tube was placed and connected to pleurovac with H2O. Tube was sutured in place with 2-0 silk, and all connections banded.    Results:  There was minimal drainage, with post procedure X-ray showing near complete reexpansion of the lung.    Patient Status: Patient tolerated the procedure well. There were no complications.    Interventions:  Medications   ketamine (5 mg/mL)-propofol (5 mg/mL) 1:1 for injection (not administered)   lidocaine 2 % injection (not administered)   0.9% sodium chloride BOLUS (0 mLs Intravenous Stopped 18 0910)   ondansetron (ZOFRAN) injection 4 mg (4 mg Intravenous Given 18 0757)       Emergency Department Course:  Patient arrived via EMS.    Past medical records, nursing notes, and vitals reviewed.  0555: I performed an exam of the patient and obtained history, as documented above.     The patient was sent for a thoracic spine, chest, head and cervical spine CT while in the emergency department, findings above.    0830: I performed the procedure as stated above.    0920: I discussed the patient with Dr. Medel of General Surgery who recommended speaking w/ Dr. Jacome of thoracic surgery.    0940: I discussed the patient with Dr. Jacome of thoracic Surgery and agreed that the patient may be [facility].  Per previous conversation, Dr. Medel will admit.  Pt admitted to in med/surg bed.    Findings and plan explained to the Patient and daughter who consents to admission.     Impression & Plan    Medical Decision Makin/7-year-old female presenting status post fall with right  sided thoracic pain    Differential diagnosis includes fracture, rib fracture, intracranial hemorrhage, pneumothorax, contusion.  Patient has dementia and is an unreliable historian.  Per report from nursing staff, the patient reportedly tripped/mechanically fell.  Doubt syncope, seizure.  Imaging as noted above significant for right sided rib fractures with associated pneumothorax.  Labs significant for mild leukocytosis.  The patient remained hemodynamically stable and satting well in the emergency department.  Tube thoracostomy was subsequently performed as noted above after informed consent was obtained from the patient's daughter with good result confirmed by repeat chest x-ray.  Patient was subsequently admitted to the General surgery service in stable condition for further evaluation and management.    Critical care time: 40 minutes    Diagnosis:    ICD-10-CM    1. Closed fracture of multiple ribs of right side, initial encounter S22.41XA    2. Traumatic pneumothorax, initial encounter S27.0XXA        Disposition:  Admitted to inpatient med/surg w/ Dr. Medel    Discharge Medications:  New Prescriptions    No medications on file     Mariella Shook  8/11/2018   [facility] EMERGENCY DEPARTMENT  Scribe Disclosure:  I, Mariella Shook, am serving as a scribe at 5:55 AM on 8/11/2018 to document services personally performed by Juan Baeza MD based on my observations and the provider's statements to me.      Juan Baeza MD  08/11/18 0919

## 2018-08-11 NOTE — H&P
[facility]   Trauma Surgical H&P           Assessment and Plan:   Assessment:   89 year old female who presents after a same level fall.  Acute traumatic injuries by imaging: Nondisplaced fractures of the right posterior fourth and  fifth ribs with associated moderate right pneumothorax.    This case was discussed with ED physician, Dr. Hernandez.  Thoracic surgery aware of the patient and will consult.      Plan:   Admit to trauma service, inpatient status.  Pain control.  Incentive spirometer instruction and use.  pulm toilet.    Consults: thoracic surgery, hospitalist, PT/OT.                Chief Complaint:   Same level fall     History is obtained from the patient and the EMR.         History of Present Illness:   This patient is a 89 year old  female, SNF resident, h/o dementia who presented to the ED after same level fall.  Per the EMR she fell when walking back to bed.  She complained of right sided chest pain, worse with breathing.  Currently she denies pain and appears comfortable.     Unknown if loss of consciousness.  Anticoagulation: No      History of syncope: unknown  History of falls:  unknown.      Denies shortness of breath, chest pain, abdominal pain, nausea, emesis, dizziness, visual changes, headache, neck pain, back pain, extremity pain.               Past Medical History:    has no past medical history on file.          Past Surgical History:   No past surgical history on file.            Social History:     Social History   Substance Use Topics     Smoking status: Not on file     Smokeless tobacco: Not on file     Alcohol use Not on file             Family History:   Noncontributory       Allergies:   All allergies reviewed and addressed          Medications:     No current facility-administered medications on file prior to encounter.   No current outpatient prescriptions on file prior to encounter.    ketamine (5 mg/mL)-propofol (5 mg/mL) 1:1 for injection  0.025-0.3 mL/kg  Intravenous Once     lidocaine                Review of Systems:   The 10 point review of systems is negative other than noted in the HPI.           Physical Exam:   BP (!) 172/98  Pulse 72  Temp 97.8  F (36.6  C) (Oral)  Resp 20  Wt 54.4 kg (120 lb)  SpO2 98%  General appearance: well-nourished, no apparent distress  HEENT: Pupils are equal and round.  Head is normocephalic and atraumatic.  Neck is without thyromegaly, masses, swelling, ecchymosis.  TTP none.  Chest:  Clear to auscultation bilaterally.  Heart with regular rate and rhythm, no murmurs.  No palpable swelling, masses, ecchymosis, no crepitus, no visible deformity.  Abdomen:  Nondistended, soft, nontender to palpation.  No masses.  Back: no palpable masses or visible deformity.  TTP none.  Extremities: Warm without edema.  Strength 4+ all four extremities.  Neurologic: nonfocal, grossly intact times four extremities, alert and oriented times three.  Cranial nerves II through XII intact grossly.  Psychiatric: mood and affect are appropriate.  Skin: without jaundice, lesions, rashes.  Abrasions none.  Ecchymosis none as.           Data:   WBC -   WBC   Date Value Ref Range Status   08/11/2018 13.4 (H) 4.0 - 11.0 10e9/L Final   ], HgB - Hemoglobin   Date Value Ref Range Status   08/11/2018 13.5 11.7 - 15.7 g/dL Final   ]   Liver Function Studies - No results for input(s): PROTTOTAL, ALBUMIN, BILITOTAL, ALKPHOS, AST, ALT, BILIDIRECT in the last 86893 hours.      IMAGING:  Results for orders placed or performed during the hospital encounter of 08/11/18   Head CT w/o contrast    Narrative    CT HEAD W/O CONTRAST 8/11/2018 6:32 AM    HISTORY: Fall, evaluate for intracranial hemorrhage.    COMPARISON: 11/25/2015    TECHNIQUE: Noncontrast head CT.  Radiation dose for this scan was  reduced using automated exposure control, adjustment of the mA and/or  kV according to patient size, or iterative reconstruction technique.    FINDINGS: No intracranial  hemorrhage. No abnormal extra-axial fluid  collection. Midline is maintained. Ventricular volumes are stable.  Chronic generalized atrophy. Calvarium is intact. Sinuses and mastoid  air cells are normally aerated.      Impression    IMPRESSION: No acute abnormality.    MYAH DAVID MD   Cervical spine CT w/o contrast    Narrative    CT CERVICAL SPINE W/O CONTRAST 8/11/2018 6:40 AM    HISTORY: Fall, evaluate for fracture.    COMPARISON: 11/25/2015    TECHNIQUE: Noncontrast cervical spine CT.  Radiation dose for this  scan was reduced using automated exposure control, adjustment of the  mA and/or kV according to patient size, or iterative reconstruction  technique.    FINDINGS: No acute fracture or malalignment. Chronic degenerative  changes at C5-C6-C7, stable in comparison with 11/25/2015.  Prevertebral soft tissues are normal. No acute soft tissue  abnormality.      Impression    IMPRESSION: No acute abnormality.    MYAH DAVID MD   CT Thoracic Spine w/o Contrast    Narrative    CT THORACIC SPINE W/O CONTRAST 8/11/2018 6:47 AM    HISTORY: Tenderness after fall.    COMPARISON: None.    TECHNIQUE: Noncontrast thoracic spine CT.  Radiation dose for this  scan was reduced using automated exposure control, adjustment of the  mA and/or kV according to patient size, or iterative reconstruction  technique.    FINDINGS: Nondisplaced fractures of the right posterior fourth and  fifth ribs and moderate right pneumothorax are better visualized on  dedicated chest CT. Thoracic vertebral bodies appear intact. No  malalignment. Multilevel loss of disc space associated with marginal  osteophyte formation.       Impression    IMPRESSION:   1. Degenerative change in the thoracic spine without acute thoracic  spine abnormality.  2. Nondisplaced fractures of the right posterior fourth and fifth ribs  with moderate right pneumothorax, better visualized on dedicated chest  CT.    MYAH DAVID MD   CT Chest w/o Contrast    Narrative     CT CHEST W/O CONTRAST 8/11/2018 6:40 AM    HISTORY: Fall, rib pain.    COMPARISON: None.    TECHNIQUE: Noncontrast chest CT.  Radiation dose for this scan was  reduced using automated exposure control, adjustment of the mA and/or  kV according to patient size, or iterative reconstruction technique.    FINDINGS: Nondisplaced fractures of the right posterior fourth and  fifth ribs. Moderate right pneumothorax. Left-sided ribs appear intact  as do the remaining visualized osseous structures.    Moderate cardiomegaly without pericardial effusion. Patchy coronary  vascular calcification. No mediastinal or hilar adenopathy.    No acute abnormality in the visualized upper abdomen.      Impression    IMPRESSION: Nondisplaced fractures of the right posterior fourth and  fifth ribs with associated moderate right pneumothorax.    MYAH DAVID MD   Chest XR,  PA & LAT    Narrative    XR CHEST 2 VW 8/11/2018 7:31 AM    HISTORY: Pneumothorax.    COMPARISON: None.      Impression    IMPRESSION: Mild to moderate right apical pneumothorax. The left lung  appears clear. No pleural effusions appreciated. Mild cardiomegaly.    KURT ESPARZA MD   Chest  XR, 1 view PORTABLE    Narrative    CHEST ONE VIEW PORTABLE  8/11/2018 9:21 AM     HISTORY:  Chest tube placement.      COMPARISON: Earlier today.    FINDINGS: Mild elevation of the left hemidiaphragm. Superior  translation of the left humeral head, presumably related to rotator  cuff pathology.      Impression    IMPRESSION:  Placement of a right chest tube. Decreased right  pneumothorax, currently small.    JONG MARRERO MD       This note was created using voice recognition software. Undetected word substitutions or other errors may have occurred.     Debi Medel MD    Time spent with the patient, reviewing the EMR, reviewing laboratory and imaging studies, more than 50% of which was counseling and coordinating care:  35 minutes.

## 2018-08-11 NOTE — IP AVS SNAPSHOT
[place] [address] [facility]    [address]    [facility] [address]    Phone:  [phone]    Fax:  [phone]                                       After Visit Summary   8/11/2018    Coty Trotter    MRN: [id number]           After Visit Summary Signature Page     I have received my discharge instructions, and my questions have been answered. I have discussed any challenges I see with this plan with the nurse or doctor.    ..........................................................................................................................................  Patient/Patient Representative Signature      ..........................................................................................................................................  Patient Representative Print Name and Relationship to Patient    ..................................................               ................................................  Date                                            Time    ..........................................................................................................................................  Reviewed by Signature/Title    ...................................................              ..............................................  Date                                                            Time

## 2018-08-11 NOTE — IP AVS SNAPSHOT
MRN:[id number]                      After Visit Summary   8/11/2018    Coty Trotter    MRN: [id number]           Thank you!     Thank you for choosing [facility] for your care. Our goal is always to provide you with excellent care. Hearing back from our patients is one way we can continue to improve our services. Please take a few minutes to complete the written survey that you may receive in the mail after you visit. If you would like to speak to someone directly about your visit please contact Patient Relations at [phone]. Thank you!          Patient Information     Date Of Birth          4/27/1921        Designated Caregiver       Most Recent Value    Caregiver    Will someone help with your care after discharge? yes    Name of designated caregiver AL     Phone number of caregiver AL    Caregiver address AL      About your hospital stay     You were admitted on:  August 11, 2018 You last received care in the:  [place] [address] [facility]    You were discharged on:  August 15, 2018        Reason for your hospital stay       Fall, Pneumonthorax                  Who to Call     For medical emergencies, please call 911.  For non-urgent questions about your medical care, please call your primary care provider or clinic, [phone]          Attending Provider     Provider Specialty    Juan Baeza MD Emergency Medicine    [facility], Debi Estrada MD Surgery       Primary Care Provider Office Phone # Fax #    Ceasar Churchill [phone]      After Care Instructions     Activity       Your activity upon discharge: activity as tolerated, fall precaution            Diet       Follow this diet upon discharge: Orders Placed This Encounter      Room Service      Combination Diet Regular Diet Adult            Wound care and dressings       Instructions to care for your wound at home: as directed.                  Follow-up Appointments     Follow-up  and recommended labs and tests        Follow up with primary care provider, Ceasar Churchill, within 7 days for hospital follow- up.                  Additional Services     Home Care OT Referral for Hospital Discharge       OT to eval and treat    Your provider has ordered home care - occupational therapy. If you have not been contacted within 2 days of your discharge please call the department phone number listed on the top of this document.            Home Care PT Referral for Hospital Discharge       PT to eval and treat    Your provider has ordered home care - physical therapy. If you have not been contacted within 2 days of your discharge please call the department phone number listed on the top of this document.            Home care nursing referral       RN skilled nursing visit. RN to provide personal cares as needed, daily dressing change to previous chest tube site.    Your provider has ordered home care nursing services. If you have not been contacted within 2 days of your discharge please call the inpatient department phone number at [phone] .                  Future tests that were ordered for you     XR Chest 2 Views                 Further instructions from your care team       HOME CARE FOLLOWING TRAUMA ADMISSION - NONSURGICAL FRACTURES/ABRASIONS  DANAY Hauser N. Guttormson, D. Maurer, SANDRA Mathew    NEXT APPOINTMENT:  Follow-up with your primary care provider in 2-3 days for recheck of your injuries and overall medical status.  At that time you may address ongoing care recommendations and activity restrictions.    WOUND CARE:  Apply bacitracin to abrasions twice a day and cover sites with non-stick dressings afterwards.      ACTIVITY:  Light Activity -- you may immediately be up and about as tolerated.  Driving -- you may drive when comfortable and off narcotic pain medications.  Light Work -- resume when comfortable off pain medications.  (If you can drive, you probably can  work.)  Strenuous Work/Activity -- limit lifting to 10 pounds for 2 weeks.  Restrictions after this time should be recommended by your primary care provider.    DISCOMFORT:  Use pain medications as prescribed by your surgeon.  Take the pain medication with some food, when possible, to minimize side effects.  Expect gradual improvement.    DIET:  Return to diet you were on before surgery.  Drink plenty of fluids.  While taking pain medications, increase dietary fiber or add a fiber supplementation like Metamucil or Citrucel to help prevent constipation - a possible side effect of pain medications.    Surgeon office contact number:  Office Phone:  [phone]      FREQUENTLY ASKED QUESTIONS:    Q:  What can I do to minimize constipation (very hard stools, or lack of stools)?  A:  Stay well hydrated.  Increase your dietary fiber intake or take a fiber supplement -with plenty of water.  Walk around frequently.  You may consider an over-the-counter stool-softener.  Your Pharmacist can assist you with choosing one that is stocked at your pharmacy.  Constipation is also one of the most common side effects of pain medication.  If you are using pain medication, be pro-active and try to PREVENT problems with constipation by taking the steps above BEFORE constipation becomes a problem.    Q:  Why am I having a hard time sleeping now that I am at home?  A:  Many medications you receive while you are in the hospital can impact your sleep for a number of days after your surgery/hospitalization.  Decreased level of activity and naps during the day may also make sleeping at night difficult.  Try to minimize day-time naps, and get up frequently during the day to walk around your home during your recovery time.  Sleep aides may be of some help, but are not recommended for long-term use.            If you have other questions, please call the office Monday thru Friday between 8am and 5pm to discuss with the nurse or physician  "assistant.  #[phone]    There is a surgeon ON CALL on weekday evenings and over the weekend in case of urgent need only, and may be contacted at the same number.    If you are having an emergency, call 911 or proceed to your nearest emergency department.    CM: [facility] RN/PT/OT       Pending Results     No orders found from 2018 to 2018.            Statement of Approval     Ordered          08/15/18 1207  I have reviewed and agree with all the recommendations and orders detailed in this document.  EFFECTIVE NOW     Approved and electronically signed by:  Alfred Alicea MD             Admission Information     Date & Time Provider Department Dept. Phone    2018 Debi Medel MD [place] [address] [facility] [phone]      Your Vitals Were     Blood Pressure Pulse Temperature Respirations Height Weight    123/51 80 97.7  F (36.5  C) (Oral) 18 1.626 m (5' 4\") 80.8 kg (178 lb 1.6 oz)    Pulse Oximetry BMI (Body Mass Index)                95% 30.57 kg/m2          MyChart Information     [employer] lets you send messages to your doctor, view your test results, renew your prescriptions, schedule appointments and more. To sign up, go to [email or web address] . Click on \"Log in\" on the left side of the screen, which will take you to the Welcome page. Then click on \"Sign up Now\" on the right side of the page.     You will be asked to enter the access code listed below, as well as some personal information. Please follow the directions to create your username and password.     Your access code is: [email or web address]  Expires: 2018 12:14 PM     Your access code will  in 90 days. If you need help or a new code, please call your [place] clinic or [phone].        Care EveryWhere ID     This is your Care EveryWhere ID. This could be used by other organizations to access your [place] medical records  [id number]        Equal Access to Services     VAZQUEZ JAY AH: " Hadii aad ku hadasho Soomaali, waaxda luqadaha, qaybta kaalmada adeivett, faisal roshniin hayaan jung freed. So [facility] [place] [phone].    ATENCIÓN: Si sarabjit stock, tiene a nunez disposición servicios gratuitos de asistencia lingüística. Llame al [phone].    We comply with applicable federal civil rights laws and [place] laws. We do not discriminate on the basis of race, color, national origin, age, disability, sex, sexual orientation, or gender identity.               Review of your medicines      START taking        Dose / Directions    losartan 50 MG tablet   Commonly known as:  COZAAR   Used for:  Essential hypertension        Dose:  50 mg   Start taking on:  8/16/2018   Take 1 tablet (50 mg) by mouth daily   Quantity:  30 tablet   Refills:  0       valACYclovir 1000 mg tablet   Commonly known as:  VALTREX   Indication:  Shingles   Used for:  Herpes zoster dermatitis        Dose:  1000 mg   Take 1 tablet (1,000 mg) by mouth 2 times daily for 4 days   Quantity:  8 tablet   Refills:  0         CONTINUE these medicines which have NOT CHANGED        Dose / Directions    acetaminophen 325 MG tablet   Commonly known as:  TYLENOL        Dose:  650 mg   Take 650 mg by mouth 2 times daily as needed for mild pain   Refills:  0       conjugated estrogens cream   Commonly known as:  PREMARIN        Place vaginally as needed   Refills:  0       Dextromethorphan-guaiFENesin  MG/5ML syrup        Dose:  10 mL   Take 10 mLs by mouth every 4 hours as needed for cough   Refills:  0       mineral oil-white petrolatum Crea cream        Apply topically 2 times daily , apply to lower legs until resolved for dry skin   Refills:  0       PATADAY 0.2 % Soln   Generic drug:  olopatadine HCl        Dose:  1 drop   Place 1 drop into both eyes as needed   Refills:  0       * polyethylene glycol Packet   Commonly known as:  MIRALAX/GLYCOLAX        Dose:  1 packet   Take 1 packet by mouth daily   Refills:  0       *  polyethylene glycol Packet   Commonly known as:  MIRALAX/GLYCOLAX        Dose:  1 packet   Take 1 packet by mouth as needed for constipation   Refills:  0       RA BUNION CUSHION Pads        Apply topically as needed   Refills:  0       REFRESH 1 % ophthalmic solution   Generic drug:  carboxymethylcellulose        Dose:  3 drop   Place 3 drops into both eyes 2 times daily as needed for dry eyes   Refills:  0       SYNTHROID 75 MCG tablet   Generic drug:  levothyroxine        Dose:  75 mcg   Take 75 mcg by mouth daily   Refills:  0       triamcinolone 0.1 % cream   Commonly known as:  KENALOG        Apply topically 2 times daily as needed for irritation , apply to rash on face, legs & back   Refills:  0       vitamin D 2000 units tablet        Dose:  1 tablet   Take 1 tablet by mouth daily   Refills:  0       ZOLOFT 100 MG tablet   Generic drug:  sertraline        Dose:  100 mg   Take 100 mg by mouth daily   Refills:  0       * Notice:  This list has 2 medication(s) that are the same as other medications prescribed for you. Read the directions carefully, and ask your doctor or other care provider to review them with you.         Where to get your medicines      These medications were sent to [facility] [address] [place] [address]     Phone:  [phone]     losartan 50 MG tablet    valACYclovir 1000 mg tablet                Protect others around you: Learn how to safely use, store and throw away your medicines at www.disposemymeds.org.        ANTIBIOTIC INSTRUCTION     You've Been Prescribed an Antibiotic - Now What?  Your healthcare team thinks that you or your loved one might have an infection. Some infections can be treated with antibiotics, which are powerful, life-saving drugs. Like all medications, antibiotics have side effects and should only be used when necessary. There are some important things you should know about your  antibiotic treatment.      Your healthcare team may run tests before you start taking an antibiotic.    Your team may take samples (e.g., from your blood, urine or other areas) to run tests to look for bacteria. These test can be important to determine if you need an antibiotic at all and, if you do, which antibiotic will work best.      Within a few days, your healthcare team might change or even stop your antibiotic.    Your team may start you on an antibiotic while they are working to find out what is making you sick.    Your team might change your antibiotic because test results show that a different antibiotic would be better to treat your infection.    In some cases, once your team has more information, they learn that you do not need an antibiotic at all. They may find out that you don't have an infection, or that the antibiotic you're taking won't work against your infection. For example, an infection caused by a virus can't be treated with antibiotics. Staying on an antibiotic when you don't need it is more likely to be harmful than helpful.      You may experience side effects from your antibiotic.    Like all medications, antibiotics have side effects. Some of these can be serious.    Let you healthcare team know if you have any known allergies when you are admitted to the hospital.    One significant side effect of nearly all antibiotics is the risk of severe and sometimes deadly diarrhea caused by Clostridium difficile (C. Difficile). This occurs when a person takes antibiotics because some good germs are destroyed. Antibiotic use allows C. diificile to take over, putting patients at high risk for this serious infection.    As a patient or caregiver, it is important to understand your or your loved one's antibiotic treatment. It is especially important for caregivers to speak up when patients can't speak for themselves. Here are some important questions to ask your healthcare team.    What infection is  this antibiotic treating and how do you know I have that infection?    What side effects might occur from this antibiotic?    How long will I need to take this antibiotic?    Is it safe to take this antibiotic with other medications or supplements (e.g., vitamins) that I am taking?     Are there any special directions I need to know about taking this antibiotic? For example, should I take it with food?    How will I be monitored to know whether my infection is responding to the antibiotic?    What tests may help to make sure the right antibiotic is prescribed for me?      Information provided by:  www.cdc.gov/getsmart  U.S. Department of Health and Human Services  Centers for disease Control and Prevention  National Center for Emerging and Zoonotic Infectious Diseases  Division of Healthcare Quality Promotion             Medication List: This is a list of all your medications and when to take them. Check marks below indicate your daily home schedule. Keep this list as a reference.      Medications           Morning Afternoon Evening Bedtime As Needed    acetaminophen 325 MG tablet   Commonly known as:  TYLENOL   Take 650 mg by mouth 2 times daily as needed for mild pain   Last time this was given:  650 mg on 8/15/2018 12:49 AM   Next Dose Due:  Dose available this evening if needed                                   conjugated estrogens cream   Commonly known as:  PREMARIN   Place vaginally as needed   Next Dose Due:  Available when needed                                   Dextromethorphan-guaiFENesin  MG/5ML syrup   Take 10 mLs by mouth every 4 hours as needed for cough   Next Dose Due:  Available when needed                                   losartan 50 MG tablet   Commonly known as:  COZAAR   Take 1 tablet (50 mg) by mouth daily   Start taking on:  8/16/2018   Last time this was given:  50 mg on 8/15/2018  9:00 AM   Next Dose Due:  8/16:AM                                   mineral oil-white petrolatum Crea  cream   Apply topically 2 times daily , apply to lower legs until resolved for dry skin   Next Dose Due:  8/15: evening                                      PATADAY 0.2 % Soln   Place 1 drop into both eyes as needed   Generic drug:  olopatadine HCl   Next Dose Due:  Resume as needed                                   * polyethylene glycol Packet   Commonly known as:  MIRALAX/GLYCOLAX   Take 1 packet by mouth daily   Last time this was given:  17 g on 8/15/2018  8:58 AM   Next Dose Due:  8/16:AM                                   * polyethylene glycol Packet   Commonly known as:  MIRALAX/GLYCOLAX   Take 1 packet by mouth as needed for constipation   Last time this was given:  17 g on 8/15/2018  8:58 AM   Next Dose Due:  Resume as needed                                   RA BUNION CUSHION Pads   Apply topically as needed   Next Dose Due:  Resume as needed                                   REFRESH 1 % ophthalmic solution   Place 3 drops into both eyes 2 times daily as needed for dry eyes   Generic drug:  carboxymethylcellulose   Next Dose Due:  Resume as needed                                   SYNTHROID 75 MCG tablet   Take 75 mcg by mouth daily   Last time this was given:  75 mcg on 8/15/2018  8:59 AM   Generic drug:  levothyroxine   Next Dose Due:  8/16:AM                                   triamcinolone 0.1 % cream   Commonly known as:  KENALOG   Apply topically 2 times daily as needed for irritation , apply to rash on face, legs & back   Next Dose Due:  Resume as needed                                   valACYclovir 1000 mg tablet   Commonly known as:  VALTREX   Take 1 tablet (1,000 mg) by mouth 2 times daily for 4 days   Last time this was given:  1,000 mg on 8/15/2018  8:59 AM   Next Dose Due:  8/15: evening                                      vitamin D 2000 units tablet   Take 1 tablet by mouth daily   Next Dose Due:  8/16:AM                                   ZOLOFT 100 MG tablet   Take 100 mg by mouth daily    Last time this was given:  100 mg on 8/15/2018  8:59 AM   Generic drug:  sertraline   Next Dose Due:  8/16:AM                                   * Notice:  This list has 2 medication(s) that are the same as other medications prescribed for you. Read the directions carefully, and ask your doctor or other care provider to review them with you.

## 2018-08-11 NOTE — CONSULTS
Hospitalist Consultation      Coty Trotter MRN# [id number]   YOB: 1921 Age: 89 year old   Date of Admission: 8/11/2018     Requesting Physician: Dr. Medel   Reason for consult: Medical Management           Assessment and Plan:     89-year-old woman with dementia lives in assisted living relatively healthy fell going to bed at night, sustained a right chest traumatic injury with fourth and fifth rib fractures, and a moderate pneumothorax on the right side.  Treated in the emergency room with a chest tube and admitted for further management.  We were consulted to medically manage the patient with her surgeon.        1. Right fourth and fifth rib fracture complicated by pneumothorax, chest tube management by surgery    2. Mechanical fall, patient has prior history, ED visit in 2015 for mechanical fall, no clear syncope or chest pain or neurological symptoms prior to her fall    3. Hypertension, patient has history in her record of hypertension, I spoke with her daughter who confirmed that the patient is on losartan 50 mg daily which will continue here in the hospital    4. Dementia, watch for delirium especially with narcotic use.  According to her daughter patient can become aggressive with delirium especially in a strange apartment.  Delirium protocol will be initiated, also if needed can have a small dose of 12.5 mg of Seroquel at bedtime    Encourage incentive spirometer use  Physical therapy  consulted  DVT prophylaxis with pneumatic devices   CODE STATUS discussed with the daughter, patient is definitely DNR/DNI according to her daughter and caregiver  Expected stay likely 2-3 days, defer to primary team           History of Present Illness:   This patient is a 89 year old female who presents with with history of dementia, relatively healthy otherwise, she was going back to bed at night when she fell and sustained right chest injury resulting in multiple rib fractures and  pneumothorax.  Denies complaints to me, she is not sure why she is in the hospital, she remembers that she felt, however she does not remember that she was told she has repeat fractures.  Denies any fever and chills any cough.  She does have pain at the site of the chest tube.  Review of system is limited by patient memory              Past Medical History:   Hypothyroidism  Dementia  GERD  Prior history of hypertension in her record, she denied            Social History:   Denies smoking, denies alcohol use         Family History:   Reviewed noncontributory          Allergies:     Allergies   Allergen Reactions     Sulfa Drugs              Medications:     Prescriptions Prior to Admission   Medication Sig Dispense Refill Last Dose     acetaminophen (TYLENOL) 325 MG tablet Take 650 mg by mouth 2 times daily as needed for mild pain   8/10/2018 at hs     carboxymethylcellulose (REFRESH) 1 % ophthalmic solution Place 3 drops into both eyes 2 times daily as needed for dry eyes   prn     Cholecalciferol (VITAMIN D) 2000 units tablet Take 1 tablet by mouth daily   8/10/2018 at am     conjugated estrogens (PREMARIN) cream Place vaginally as needed   prn     Dextromethorphan-guaiFENesin  MG/5ML syrup Take 10 mLs by mouth every 4 hours as needed for cough   prn     Foot Care Products (RA BUNION CUSHION) PADS Apply topically as needed   prn     levothyroxine (SYNTHROID) 75 MCG tablet Take 75 mcg by mouth daily   8/10/2018 at hs     mineral oil-white petrolatum (MINERIN/EUCERIN) CREA cream Apply topically 2 times daily , apply to lower legs until resolved for dry skin   8/10/2018 at hs     olopatadine HCl (PATADAY) 0.2 % SOLN Place 1 drop into both eyes as needed   prn     polyethylene glycol (MIRALAX/GLYCOLAX) Packet Take 1 packet by mouth daily   8/10/2018 at hs     polyethylene glycol (MIRALAX/GLYCOLAX) Packet Take 1 packet by mouth as needed for constipation   prn     sertraline (ZOLOFT) 100 MG tablet Take 100 mg by  "mouth daily   8/10/2018 at am     triamcinolone (KENALOG) 0.1 % cream Apply topically 2 times daily as needed for irritation , apply to rash on face, legs & back   prn               Review of Systems:   A comprehensive greater than 10 system review of systems was carried out.  Pertinent positives and negatives are noted above.  Otherwise negative for contributory info.            Physical Exam:   Vitals were reviewed  Blood pressure 153/50, pulse 72, temperature 96  F (35.6  C), temperature source Oral, resp. rate 20, height 1.626 m (5' 4\"), weight 80.8 kg (178 lb 1.6 oz), SpO2 99 %.  Exam:   GENERAL:  Comfortable.  PSYCH: pleasant, oriented x1, No acute distress.  EYES: PERRLA, Normal conjunctiva.  HEART:  Normal S1, S2 with no edema.  LUNGS:  Clear to auscultation, normal Respiratory effort.  ABDOMEN:  Soft, no hepatosplenomegaly, normal bowel sounds.  SKIN:  Dry to touch, No rash.             Data:   Past 24 hours labs, studies, and imaging were reviewed.       Lab Results   Component Value Date     08/11/2018    Lab Results   Component Value Date    CHLORIDE 103 08/11/2018    Lab Results   Component Value Date    BUN 21 08/11/2018      Lab Results   Component Value Date    POTASSIUM 4.1 08/11/2018    Lab Results   Component Value Date    CO2 26 08/11/2018    Lab Results   Component Value Date    CR 0.96 08/11/2018        Lab Results   Component Value Date    WBC 13.4 (H) 08/11/2018    HGB 13.5 08/11/2018    HCT 42.4 08/11/2018     (H) 08/11/2018     08/11/2018     "

## 2018-08-11 NOTE — CONSULTS
THORACIC SURGERY CONSULT NOTE    Consult Reason:  Closed rib fractures (2); traumatic pneumothorax    HPI: This is a 89 year old woman who fell at her assisted living facility.  She was found to have closed right 4th and 5th rib fractures with an associated pneumothorax.  The lung expanded after chest tube insertion.  The patient is somewhat confused and does not know how the fall occurred.  She has dementia.    A/P: Patient is a 89 year old female with closed rib fractures (right 4th and 5th) and a traumatic pneumothorax.  There is no air leak.  - Chest tube to water seal  - CXR in AM.  Hope to remove tube tomorrow.  - Pain control  - Incentive spirometry  - Ambulation  - PT    I spent a total of 10 minutes with the patient, more than 50% of which were spent in counseling, coordination of care, and face-to-face time.      Thank you for the opportunity to participate in the care of this patient.    PMH:  Hypothyroidism  Dementia  GERD    FH:    Noncontributory per chart    SH:  Unknown    Allergies:  Allergies   Allergen Reactions     Sulfa Drugs        Home Meds:  Prescriptions Prior to Admission   Medication Sig Dispense Refill Last Dose     acetaminophen (TYLENOL) 325 MG tablet Take 650 mg by mouth 2 times daily as needed for mild pain   8/10/2018 at hs     carboxymethylcellulose (REFRESH) 1 % ophthalmic solution Place 3 drops into both eyes 2 times daily as needed for dry eyes   prn     Cholecalciferol (VITAMIN D) 2000 units tablet Take 1 tablet by mouth daily   8/10/2018 at am     conjugated estrogens (PREMARIN) cream Place vaginally as needed   prn     Dextromethorphan-guaiFENesin  MG/5ML syrup Take 10 mLs by mouth every 4 hours as needed for cough   prn     Foot Care Products (RA BUNION CUSHION) PADS Apply topically as needed   prn     levothyroxine (SYNTHROID) 75 MCG tablet Take 75 mcg by mouth daily   8/10/2018 at hs     mineral oil-white petrolatum (MINERIN/EUCERIN) CREA cream Apply topically 2 times  daily , apply to lower legs until resolved for dry skin   8/10/2018 at hs     olopatadine HCl (PATADAY) 0.2 % SOLN Place 1 drop into both eyes as needed   prn     polyethylene glycol (MIRALAX/GLYCOLAX) Packet Take 1 packet by mouth daily   8/10/2018 at hs     polyethylene glycol (MIRALAX/GLYCOLAX) Packet Take 1 packet by mouth as needed for constipation   prn     sertraline (ZOLOFT) 100 MG tablet Take 100 mg by mouth daily   8/10/2018 at am     triamcinolone (KENALOG) 0.1 % cream Apply topically 2 times daily as needed for irritation , apply to rash on face, legs & back   prn           Physical Exam:  Temp:  [96  F (35.6  C)-97.8  F (36.6  C)] 96  F (35.6  C)  Pulse:  [72] 72  Heart Rate:  [59-79] 66  Resp:  [11-28] 20  BP: (110-213)/() 153/50  SpO2:  [93 %-99 %] 99 %    No air leak    Labs:  CBC  Recent Labs  Lab 08/11/18  0753   WBC 13.4*   HGB 13.5        BMP  Recent Labs  Lab 08/11/18  0753      POTASSIUM 4.1   CHLORIDE 103   CO2 26   BUN 21   CR 0.96   *     LFT  Recent Labs  Lab 08/11/18  0753   INR 0.92       Imaging:  I reviewed several xrays and a CT chest showing the right 4th and 5th rib fractures, the pneumothorax and the reexpanded lung after chest tube insertion.          Johnnie Jacome

## 2018-08-11 NOTE — IP AVS SNAPSHOT
` ` Patient Information     Patient Name Sex     Coty Trotter ([id number]) Female 1921       Room Bed    0503 0503-01      Patient Demographics     Address Phone    [facility]  [address] [address] [facility] [id number] [phone] (Home)  none (Work)  [phone] (Mobile)      Patient Ethnicity & Race     Ethnic Group Patient Race    American White      Emergency Contact(s)     Name Relation Home Work Mobile    Jaqueline Pineda [phone] none [phone]      Documents on File        Status Date Received Description       Documents for the Patient    Affiliate Privacy placeholder   phase3    Consent for EHR Access Received 11/25/15     Privacy Notice - [place] Received 11/25/15     Insurance Card       External Medication Information Consent       Patient ID       [facility] Specified Other       Consent for Services - Hospital/Clinic Received () 11/25/15     HIM AARON Authorization  11/27/15 [facility]    Consent for Services - Hospital and Clinic Received 18     HIE Auth Received 18     Consent for Services - Hospital/Clinic Received (Deleted) 11/25/15        Documents for the Encounter    CMS IM for Patient Signature Received 18       Admission Information     Attending Provider Admitting Provider Admission Type Admission Date/Time    Debi Medel MD Gavin, Elizabeth Gross, MD Emergency 18  0527    Discharge Date Hospital Service Auth/Cert Status Service Area     General Medicine [facility]    Unit Room/Bed Admission Status        5 MEDICAL SURGICAL 0503/0503-01 Admission (Confirmed)       Admission     Complaint    Pneumothorax, acute      Hospital Account     Name Acct ID Class Status Primary Coverage    Coty Trotter [id number] Inpatient Open MEDICARE - MEDICARE            Guarantor Account (for Hospital Account #[id number])     Name Relation to Pt Service Area Active? Acct Type    Sergo,  Coty COLINDRES Self FCS Yes Personal/Family    Address Phone          [facility]  [address] [place] [address] none(H)  none(O)              Coverage Information (for Hospital Account #[id number])     F/O Payor/Plan Precert #    MEDICARE/MEDICARE     Subscriber Subscriber #    Coty Trotter [id number]    Address Phone    [employer]  [address]  [place] [address] [phone]

## 2018-08-11 NOTE — IP AVS SNAPSHOT
` `     [place] [address] MEDICAL SURGICAL: [phone]            Medication Administration Report for Coty Trotter as of 08/15/18 2930   Legend:    Given Hold Not Given Due Canceled Entry Other Actions    Time Time (Time) Time  Time-Action       Inactive    Active    Linked        Medications 08/09/18 08/10/18 08/11/18 08/12/18 08/13/18 08/14/18 08/15/18    acetaminophen (TYLENOL) tablet 650 mg  Dose: 650 mg  Freq: EVERY 4 HOURS PRN Route: PO  PRN Reason: mild pain  Start: 08/11/18 1225   Admin Instructions: Alternate ibuprofen (if ordered) with acetaminophen.  Maximum acetaminophen dose from all sources = 75 mg/kg/day not to exceed 4 grams/day.    Admin. Amount: 2 tablet (2 × 325 mg tablet)  Last Admin: 08/15/18 0049  Dispense Loc: [facility]       1655 (650 mg)-Given       2121 (650 mg)-Given         0415 (650 mg)-Given        0115 (650 mg)-Given        0049 (650 mg)-Given           alum & mag hydroxide-simethicone (MYLANTA ES/MAALOX  ES) suspension 30 mL  Dose: 30 mL  Freq: EVERY 4 HOURS PRN Route: PO  PRN Reason: indigestion  Start: 08/11/18 1225   Admin Instructions: Shake well.    Admin. Amount: 30 mL  Dispense Loc: [facility]  Volume: 30 mL               bisacodyl (DULCOLAX) EC tablet 5 mg  Dose: 5 mg  Freq: DAILY PRN Route: PO  PRN Reason: constipation  Start: 08/11/18 1225   Admin Instructions: Do not crush or chew. Swallow whole. May titrate 1 tablet each day until response. Maximum of 3 tablets daily. Hold for loose stools. This is the first step of a three step constipation treatment.    Admin. Amount: 1 tablet (1 × 5 mg tablet)  Dispense Loc: [facility]              Or  bisacodyl (DULCOLAX) EC tablet 10 mg  Dose: 10 mg  Freq: DAILY PRN Route: PO  PRN Reason: constipation  Start: 08/11/18 1225   Admin Instructions: Do not crush or chew. Swallow whole. May titrate 1 tablet each day until response. Maximum of 3 tablets daily. Hold for loose stools. This is the first step of a three step  constipation treatment.    Admin. Amount: 2 tablet (2 × 5 mg tablet)  Dispense Loc: [facility]              Or  bisacodyl (DULCOLAX) EC tablet 15 mg  Dose: 15 mg  Freq: DAILY PRN Route: PO  PRN Reason: constipation  Start: 08/11/18 1225   Admin Instructions: Do not crush or chew. Swallow whole. May titrate 1 tablet each day until response. Maximum of 3 tablets daily. Hold for loose stools. This is the first step of a three step constipation treatment.    Admin. Amount: 3 tablet (3 × 5 mg tablet)  Dispense Loc: [facility]               bisacodyl (DULCOLAX) Suppository 10 mg  Dose: 10 mg  Freq: DAILY PRN Route: RE  PRN Reason: constipation  Start: 08/11/18 1225   Admin Instructions: Hold for loose stools.  This is the third step of a three step constipation treatment protocol.    Admin. Amount: 1 suppository (1 × 10 mg suppository)  Dispense Loc: [facility]               HYDROmorphone (PF) (DILAUDID) injection 0.2 mg  Dose: 0.2 mg  Freq: EVERY 2 HOURS PRN Route: IV  PRN Reason: other  PRN Comment: pain control or improvement in physical function. Hold dose for analgesic side effects.  Start: 08/11/18 1225   Admin Instructions: Notify provider to assess for uncontrolled pain or analgesic side effects. Hold while on PCA or with regular IV opioid dosing<br><br>Give IF unable to tolerate oral option for pain control<br>  For ordered doses up to 4 mg give IV Push undiluted. Administer each 2mg over 2-5 minutes.    Admin. Amount: 0.2 mg  Last Admin: 08/12/18 0601  Dispense Loc: [facility]        0218 (0.2 mg)-Given       0601 (0.2 mg)-Given              labetalol (NORMODYNE/TRANDATE) injection 10 mg  Dose: 10 mg  Freq: EVERY 2 HOURS PRN Route: IV  PRN Reason: high blood pressure  PRN Comment: give for SBP > 180 or DBP >100  Start: 08/13/18 0400   Admin Instructions: Hold for HR < 60  For ordered doses up to 80 mg, give IV Push undiluted. Give each 20 mg over 2 minutes.    Admin. Amount: 10 mg = 2 mL Conc: 5  "mg/mL  Dispense Loc: [facility]  Volume: 4 mL               levothyroxine (SYNTHROID/LEVOTHROID) tablet 75 mcg  Dose: 75 mcg  Freq: DAILY Route: PO  Start: 08/11/18 1600   Admin Instructions: Separate oral administration of iron- or calcium-containing products and levothyroxine by at least 4 hours.    Admin. Amount: 1 tablet (1 × 75 mcg tablet)  Last Admin: 08/15/18 0859  Dispense Loc: [facility]       1655 (75 mcg)-Given        0842 (75 mcg)-Given        0845 (75 mcg)-Given        0852 (75 mcg)-Given        0859 (75 mcg)-Given           lidocaine (LMX4) kit  Freq: EVERY 1 HOUR PRN Route: Top  PRN Reason: pain  PRN Comment: with VAD insertion or accessing implanted port.  Start: 08/11/18 1225   Admin Instructions: Do NOT give if patient has a history of allergy to any local anesthetic or any \"сергей\" product.   Apply 30 minutes prior to VAD insertion or port access.  MAX Dose:  2.5 g (  of 5 g tube)    Dispense Loc: [facility]               lidocaine 1 % 1 mL  Dose: 1 mL  Freq: EVERY 1 HOUR PRN Route: OTHER  PRN Comment: mild pain with VAD insertion or accessing implanted port  Start: 08/11/18 1225   Admin Instructions: Do NOT give if patient has a history of allergy to any local anesthetic or any \"сергей\" product. MAX dose 1 mL subcutaneous OR intradermal in divided doses.    Admin. Amount: 1 mL  Dispense Loc: [facility] Stock  Volume: 2 mL               losartan (COZAAR) tablet 50 mg  Dose: 50 mg  Freq: DAILY Route: PO  Start: 08/11/18 1615   Admin. Amount: 1 tablet (1 × 50 mg tablet)  Last Admin: 08/15/18 0900  Dispense Loc: [facility]       1704 (50 mg)-Given [C]        0842 (50 mg)-Given        0845 (50 mg)-Given        0852 (50 mg)-Given        0900 (50 mg)-Given           magnesium sulfate 4 g in 100 mL sterile water (premade)  Dose: 4 g  Freq: EVERY 4 HOURS PRN Route: IV  PRN Reason: magnesium supplementation  Start: 08/11/18 1225   Admin Instructions: For serum Mg++ less than 1.6 mg/dL  Give 4 g and " recheck magnesium level 2 hours after dose, and next AM.    Admin. Amount: 4 g = 100 mL Conc: 4 g/100 mL  Dispense Loc:  Main Pharmacy  Infused Over: 120 Minutes  Volume: 100 mL               melatonin tablet 1 mg  Dose: 1 mg  Freq: AT BEDTIME PRN Route: PO  PRN Reason: sleep  Start: 08/11/18 1225   Admin Instructions: Do not give unless at least 6 hours of uninterrupted sleep is expected.    Admin. Amount: 1 tablet (1 × 1 mg tablet)  Dispense Loc: [facility]               naloxone (NARCAN) injection 0.1-0.4 mg  Dose: 0.1-0.4 mg  Freq: EVERY 2 MIN PRN Route: IV  PRN Reason: opioid reversal  Start: 08/11/18 1225   Admin Instructions: For respiratory rate LESS than or EQUAL to 8.  Partial reversal dose:  0.1 mg titrated q 2 minutes for Analgesia Side Effects Monitoring Sedation Level of 3 (frequently drowsy, arousable, drifts to sleep during conversation).Full reversal dose:  0.4 mg bolus for Analgesia Side Effects Monitoring Sedation Level of 4 (somnolent, minimal or no response to stimulation).  For ordered doses up to 2mg give IVP. Give each 0.4mg over 15 seconds in emergency situations. For non-emergent situations further dilute in 9mL of NS to facilitate titration of response.    Admin. Amount: 0.1-0.4 mg = 0.25-1 mL Conc: 0.4 mg/mL  Dispense Loc: [facility]  Volume: 1 mL               ondansetron (ZOFRAN-ODT) ODT tab 4 mg  Dose: 4 mg  Freq: EVERY 6 HOURS PRN Route: PO  PRN Reasons: nausea,vomiting  Start: 08/11/18 1225   Admin Instructions: This is Step 1 of nausea and vomiting management.  If nausea not resolved in 15 minutes, go to Step 2 prochlorperazine (COMPAZINE). Do not push through foil backing. Peel back foil and gently remove. Place on tongue immediately. Administration with liquid unnecessary  With dry hands, peel back foil backing and gently remove tablet; do not push oral disintegrating tablet through foil backing; administer immediately on tongue and oral disintegrating tablet dissolves in  seconds; then swallow with saliva; liquid not required.    Admin. Amount: 1 tablet (1 × 4 mg tablet)  Dispense Loc: [facility]              Or  ondansetron (ZOFRAN) injection 4 mg  Dose: 4 mg  Freq: EVERY 6 HOURS PRN Route: IV  PRN Reasons: nausea,vomiting  Start: 08/11/18 1225   Admin Instructions: This is Step 1 of nausea and vomiting management.  If nausea not resolved in 15 minutes, go to Step 2 prochlorperazine (COMPAZINE).  Irritant. For ordered doses up to 4 mg, give IV Push undiluted over 2-5 minutes.    Admin. Amount: 4 mg = 2 mL Conc: 4 mg/2 mL  Dispense Loc: [facility]  Infused Over: 2-5 Minutes  Volume: 2 mL               oxyCODONE IR (ROXICODONE) tablet 5-10 mg  Dose: 5-10 mg  Freq: EVERY 3 HOURS PRN Route: PO  PRN Reason: moderate to severe pain  Start: 08/11/18 1225   Admin. Amount: 1-2 tablet (1-2 × 5 mg tablet)  Last Admin: 08/15/18 0049  Dispense Loc: [facility]         0037 (5 mg)-Given       0415 (5 mg)-Given        0116 (5 mg)-Given        0049 (5 mg)-Given           polyethylene glycol (MIRALAX/GLYCOLAX) Packet 17 g  Dose: 17 g  Freq: DAILY Route: PO  Start: 08/11/18 1600   Admin Instructions: 1 Packet = 17 grams. Mixed prescribed dose in 8 ounces of water. Follow with 8 oz. of water.    Admin. Amount: 17 g  Last Admin: 08/15/18 0858  Dispense Loc: [facility]       1656 (17 g)-Given        0842 (17 g)-Given        0846 (17 g)-Given        0852 (17 g)-Given        0858 (17 g)-Given           polyethylene glycol (MIRALAX/GLYCOLAX) Packet 17 g  Dose: 17 g  Freq: DAILY PRN Route: PO  PRN Reason: constipation  Start: 08/11/18 1225   Admin Instructions: Give in 8oz of  water, juice, or soda. Hold for loose stools.  This is the second step of a three step constipation treatment protocol.  1 Packet = 17 grams. Mixed prescribed dose in 8 ounces of water. Follow with 8 oz. of water.    Admin. Amount: 17 g  Dispense Loc: [facility]               potassium chloride (KLOR-CON) Packet 20-40 mEq  Dose:  20-40 mEq  Freq: EVERY 2 HOURS PRN Route: ORAL OR FEED  PRN Reason: potassium supplementation  Start: 08/11/18 1225   Admin Instructions: Use if unable to tolerate tablets.  If Serum K+ 3.0-3.3, dose = 60 mEq po total dose (40 mEq x1 followed in 2 hours by 20 mEq x1). Recheck K+ level 4 hours after dose and the next AM.  If Serum K+ 2.5-2.9, dose = 80 mEq po total dose (40 mEq Q2H x2). Recheck K+ level 4 hours after dose and the next AM.  If Serum K+ less than 2.5, See IV order.  Dissolve packet contents in 4-8 ounces of cold water or juice.    Admin. Amount: 20-40 mEq  Dispense Loc: [facility]               potassium chloride 10 mEq in 100 mL intermittent infusion with 10 mg lidocaine  Dose: 10 mEq  Freq: EVERY 1 HOUR PRN Route: IV  PRN Reason: potassium supplementation  Start: 08/11/18 1225   Admin Instructions: Infuse via PERIPHERAL LINE. Use potassium with lidocaine for pain with peripheral administration.  If Serum K+ 3.0-3.3, dose = 10 mEq/hr x4 doses (40 mEq IV total dose). Recheck K+ level 2 hours after dose and the next AM.  If Serum K+ less than 3.0, dose = 10 mEq/hr x6 doses (60 mEq IV total dose). Recheck K+ level 2 hours after dose and the next AM.    Admin. Amount: 10 mEq = 100 mL Conc: 10 mEq/100 mL  Dispense Loc:  Main Pharmacy  Infused Over: 1 Hours  Volume: 100 mL               potassium chloride 10 mEq in 100 mL sterile water intermittent infusion (premix)  Dose: 10 mEq  Freq: EVERY 1 HOUR PRN Route: IV  PRN Reason: potassium supplementation  Start: 08/11/18 1225   Admin Instructions: Infuse via PERIPHERAL LINE or CENTRAL LINE. Use for central line replacement if patient weight less than 65 kg, if patient is on TPN with high potassium content or if unit does not stock 20 mEq bags.   If Serum K+ 3.0-3.3, dose = 10 mEq/hr x4 doses (40 mEq IV total dose). Recheck K+ level 2 hours after dose and the next AM.   If Serum K+ less than 3.0, dose = 10 mEq/hr x6 doses (60 mEq IV total dose). Recheck K+  level 2 hours after dose and the next AM.    Admin. Amount: 10 mEq = 100 mL Conc: 10 mEq/100 mL  Dispense Loc:  Main Pharmacy  Infused Over: 60 Minutes  Volume: 100 mL               potassium chloride SA (K-DUR/KLOR-CON M) CR tablet 20-40 mEq  Dose: 20-40 mEq  Freq: EVERY 2 HOURS PRN Route: PO  PRN Reason: potassium supplementation  Start: 08/11/18 1225   Admin Instructions: Use if able to take PO.   If Serum K+ 3.0-3.3, dose = 60 mEq po total dose (40 mEq x1 followed in 2 hours by 20 mEq x1). Recheck K+ level 4 hours after dose and the next AM.  If Serum K+ 2.5-2.9, dose = 80 mEq po total dose (40 mEq Q2H x2). Recheck K+ level 4 hours after dose and the next AM.  If Serum K+ less than 2.5, See IV order.  DO NOT CRUSH    Admin. Amount: 1-2 tablet (1-2 × 20 mEq tablet)  Dispense Loc:  [facility]               prochlorperazine (COMPAZINE) injection 5 mg  Dose: 5 mg  Freq: EVERY 6 HOURS PRN Route: IV  PRN Reasons: nausea,vomiting  Start: 08/11/18 1225   Admin Instructions: This is Step 2 of nausea and vomiting management. Give if nausea not resolved 15 minutes after giving ondansetron (ZOFRAN). If nausea not resolved in 15 minutes, go to Step 3 metoclopramide (REGLAN), if ordered.  For ordered doses up to 10 mg, give IV Push undiluted. Each 5mg over 1 minute.    Admin. Amount: 5 mg = 1 mL Conc: 5 mg/mL  Dispense Loc:  [facility]  Infused Over: 1-2 Minutes  Volume: 1 mL              Or  prochlorperazine (COMPAZINE) tablet 5 mg  Dose: 5 mg  Freq: EVERY 6 HOURS PRN Route: PO  PRN Reason: vomiting  Start: 08/11/18 1225   Admin Instructions: This is Step 2 of nausea and vomiting management. Give if nausea not resolved 15 minutes after giving ondansetron (ZOFRAN). If nausea not resolved in 15 minutes, go to Step 3 metoclopramide (REGLAN), if ordered.    Admin. Amount: 1 tablet (1 × 5 mg tablet)  Dispense Loc:  [facility]              Or  prochlorperazine (COMPAZINE) Suppository 12.5 mg  Dose: 12.5 mg  Freq: EVERY 12 HOURS  PRN Route: RE  PRN Reasons: nausea,vomiting  Start: 08/11/18 1225   Admin Instructions: This is Step 2 of nausea and vomiting management. Give if nausea not resolved 15 minutes after giving ondansetron (ZOFRAN). If nausea not resolved in 15 minutes, go to Step 3 metoclopramide (REGLAN), if ordered.    Admin. Amount: 0.5 suppository (0.5 × 25 mg suppository)  Dispense Loc: [facility]               senna-docusate (SENOKOT-S;PERICOLACE) 8.6-50 MG per tablet 1 tablet  Dose: 1 tablet  Freq: 2 TIMES DAILY Route: PO  Start: 08/11/18 1230   Admin Instructions: If no bowel movement in 24 hours, increase to 2 tablets PO.  Hold for loose stools.    Admin. Amount: 1 tablet  Last Admin: 08/15/18 0859  Dispense Loc: [facility]       1415 (1 tablet)-Given               0842 (1 tablet)-Given               0845 (1 tablet)-Given       2033 (1 tablet)-Given        0852 (1 tablet)-Given       (2038)-Not Given        0859 (1 tablet)-Given       [ ] 2100          Or  senna-docusate (SENOKOT-S;PERICOLACE) 8.6-50 MG per tablet 2 tablet  Dose: 2 tablet  Freq: 2 TIMES DAILY Route: PO  Start: 08/11/18 1230   Admin Instructions: Hold for loose stools.    Admin. Amount: 2 tablet  Last Admin: 08/12/18 2045  Dispense Loc: [facility]              2121 (2 tablet)-Given               2045 (2 tablet)-Given                                             [ ] 2100           sertraline (ZOLOFT) tablet 100 mg  Dose: 100 mg  Freq: DAILY Route: PO  Start: 08/11/18 1600   Admin. Amount: 1 tablet (1 × 100 mg tablet)  Last Admin: 08/15/18 0859  Dispense Loc: [facility]       1656 (100 mg)-Given        0842 (100 mg)-Given        0845 (100 mg)-Given        0852 (100 mg)-Given        0859 (100 mg)-Given           sodium chloride (PF) 0.9% PF flush 3 mL  Dose: 3 mL  Freq: EVERY 8 HOURS Route: IK  Start: 08/11/18 1230   Admin Instructions: And Q1H PRN, to lock peripheral IV dormant line.    Admin. Amount: 3 mL  Last Admin: 08/15/18 6490  Dispense Loc: [facility]  Stock  Volume: 4 mL       1339 (3 mL)-Given       2125 (3 mL)-Given        0216 (3 mL)-Given       0846 (3 mL)-Given       1340 (3 mL)-Given       2046 (3 mL)-Given        0846 (3 mL)-Given       1205 (3 mL)-Given       2038 (3 mL)-Given        0116 (3 mL)-Given       (1208)-Not Given [C]       1403 (3 mL)-Positive [C]       2048 (3 mL)-Given        0410 (3 mL)-Given       (1448)-Not Given       [ ] 2030           sodium chloride (PF) 0.9% PF flush 3 mL  Dose: 3 mL  Freq: EVERY 1 HOUR PRN Route: IK  PRN Reason: line flush  PRN Comment: for peripheral IV flush post IV meds  Start: 18 1225   Admin. Amount: 3 mL  Dispense Loc: [facility] Stock  Volume: 4 mL               valACYclovir (VALTREX) tablet 1,000 mg  Dose: 1,000 mg  Freq: 2 TIMES DAILY Route: PO  Indications of Use: HERPES ZOSTER INFECTION  Start: 18 09   End: 18 0859   Admin Instructions: Dose adjusted due to patient's renal function    Admin. Amount: 1 tablet (1 × 1,000 mg tablet)  Last Admin: 08/15/18 0859  Dispense Loc:  [facility]  Administrations Remainin         0845 (1,000 mg)-Given       2033 (1,000 mg)-Given        0852 (1,000 mg)-Given       2047 (1,000 mg)-Given        0859 (1,000 mg)-Given       [ ] 2100          Discontinued Medications  Medications 08/09/18 08/10/18 08/11/18 08/12/18 08/13/18 08/14/18 08/15/18         Dose: 0.025-0.3 mL/kg  Weight Dosing Info: 54.4 kg  Freq: ONCE Route: IV  Start: 18 08   End: 18 1147   Admin Instructions: Induction dose = 2.7 mL (actual weight) to 5.4 mL (actual weight) (0.05-0.1 mL/kg) IV ONCE, based on patient characteristics  Repeat dose = 1.4 mL (actual weight) to 2.7 mL (actual weight) (0.025-0.05 mL/kg) IV every 5 minutes as needed    Chart TOTAL dose given on MAR at end of procedure.          Admin. Amount: 1.4-16.3 mL  Dispense Loc:  Main Pharmacy  Administrations Remainin  Volume: 30 mL   Mixture Administration Information:   Medication Type Amount   ketamine  10 MG/ML SOLN Medications 0.7-8.15 mL   propofol 200 MG/20ML EMUL Medications 0.7-8.15 mL               (7181)-Not Given         1147-Med Discontinued

## 2018-08-11 NOTE — IP AVS SNAPSHOT
"` `           [place] [address] MEDICAL SURGICAL: [phone]                                              INTERAGENCY TRANSFER FORM - NURSING   2018                    Hospital Admission Date: 2018  GORDY HAMMER   : 1921  Sex: Female        Attending Provider: Debi Medel MD     Allergies:  Sulfa Drugs    Infection:  None   Service:  GENERAL MEDI    Ht:  1.626 m (5' 4\")   Wt:  80.8 kg (178 lb 1.6 oz)   Admission Wt:  54.4 kg (120 lb)    BMI:  30.57 kg/m 2   BSA:  1.91 m 2            Patient PCP Information     Provider PCP Type    Ceasar Churchill MD General      Current Code Status     Date Active Code Status Order ID Comments User Context       Prior      Code Status History     Date Active Date Inactive Code Status Order ID Comments User Context    8/15/2018 12:07 PM  DNR/DNI [id number]  Alfred Alicea MD Outpatient    2018  4:09 PM 8/15/2018 12:07 PM DNR/DNI [id number]  Ian Bonilla MD Inpatient    2018 12:25 PM 2018  4:09 PM Full Code [id number]  Debi Medel MD Inpatient      Advance Directives        Scanned docmt in ACP Activity?           Yes, scanned ACP docmt        Hospital Problems as of 8/15/2018              Priority Class Noted POA    Pneumothorax, acute Medium  2018 Yes      Non-Hospital Problems as of 8/15/2018     None      Immunizations     Name Date      TD (ADULT, 7+) 11/25/15          END      ASSESSMENT     Discharge Profile Flowsheet     EXPECTED DISCHARGE     Patient's communication style  spoken language (English or Bilingual) 11/25/15 1757    Expected Discharge Date  08/15/18 ([facility] RN/PT/OT/HHA) 18 1111   FINAL RESOURCES      DISCHARGE NEEDS ASSESSMENT     Resources List  Assisted Living ([facility] ) 18 1042    Patient/family verbalizes understanding of discharge plan recommendations?  Yes 18 1042   Home Care  [facility] [phone], Fax: [phone] " "08/13/18 1039    Medical Team notified of plan?  yes 08/13/18 1042   SKIN      Anticipated Changes Related to Illness  none 08/13/18 1042   Inspection of bony prominences  Full 08/15/18 1008    Equipment Currently Used at Home  walker, rolling 08/13/18 1042   Inspection under devices  Full 08/15/18 1008    Transportation Available  family or friend will provide 08/12/18 1027   Skin WDL  ex 08/15/18 1008    Primary Care Clinic Name  [facility] 08/13/18 0951   Skin Color/Characteristics  pale 08/15/18 1008    Primary Care MD Name  Ceasar Churchill 08/13/18 0951   Skin Temperature  warm 08/15/18 1008    ASSESSMENT OF FUNCTIONAL STATUS     Skin Moisture  flaky;dry 08/15/18 1008    Prior to admission patient needed assistance with:  Medications;Meal preparation;Laundry/Housekeeping;Shopping;Transportation 08/13/18 1042   Skin Elasticity  slow return to original state 08/15/18 0445    GASTROINTESTINAL (ADULT,PEDIATRIC,OB)     Skin Integrity  bruise(s);incision(s);scab(s) 08/15/18 1008    GI WDL  WDL 08/15/18 1008   Additional Documentation  -- (right chest tube site drsg C/D/I) 08/14/18 0222    All Quadrants Bowel Sounds  audible and normoactive 08/15/18 1008   SAFETY      Last Bowel Movement  08/14/18 08/15/18 1008   Safety WDL  WDL 08/15/18 1008    Passing flatus  yes 08/15/18 1008   All Alarms  alarm(s) activated and audible 08/15/18 1008    COMMUNICATION ASSESSMENT                        Assessment WDL (Within Defined Limits) Definitions           Safety WDL     Effective: 09/28/15    Row Information: <b>WDL Definition:</b> Bed in low position, wheels locked; call light in reach; upper side rails up x 2; ID band on<br> <font color=\"gray\"><i>Item=AS safety wdl>>List=AS safety wdl>>Version=F14</i></font>      Skin WDL     Effective: 09/28/15    Row Information: <b>WDL Definition:</b> Warm; dry; intact; elastic; without discoloration; pressure points without redness<br> <font color=\"gray\"><i>Item=AS skin wdl>>List=AS skin " "wdl>>Version=F14</i></font>      Vitals     Vital Signs Flowsheet     VITAL SIGNS     Side Effects Monitoring: Respiratory Quality  R 08/15/18 0328    Temp  97.7  F (36.5  C) 08/15/18 0735   Side Effects Monitoring: Respiratory Depth  S 08/15/18 0328    Temp src  Oral 08/15/18 0735   Side Effects Monitoring: Sedation Level  1 08/15/18 0424    Resp  18 08/15/18 0735   HEIGHT AND WEIGHT      Pulse  80 08/15/18 0407   Height  1.626 m (5' 4\") 08/11/18 1252    Heart Rate  88 08/15/18 0735   Height Method  Stated 08/11/18 1252    Pulse/Heart Rate Source  Monitor 08/15/18 0735   Height Method  Estimated 08/11/18 0801    BP  136/61 08/15/18 0959   Weight  80.8 kg (178 lb 1.6 oz) 08/11/18 1252    BP Location  Left arm 08/15/18 0959   Weight Method  Bed scale 08/11/18 1252    OXYGEN THERAPY     Bed Scale  Standard (fitted sheet, draw sheet/ pad, cover/flat sheet, blanket, two pillows);Call light 08/11/18 1252    SpO2  96 % 08/15/18 0735   BSA (Calculated - sq m)  1.91 08/11/18 1252    O2 Device  None (Room air) 08/15/18 0735   BMI (Calculated)  30.63 08/11/18 1252    Oxygen Delivery  2 LPM 08/14/18 0815   JAEL COMA SCALE      PAIN/COMFORT     Best Eye Response  4-->(E4) spontaneous 08/13/18 1916    Patient Currently in Pain  denies 08/15/18 1009   Best Motor Response  6-->(M6) obeys commands 08/13/18 1916    Preferred Pain Scale  number (Numeric Rating Pain Scale) 08/13/18 0835   Best Verbal Response  4-->(V4) confused 08/13/18 1916    Patient's Stated Pain Goal  No pain 08/13/18 0835   [place] Coma Scale Score  14 08/13/18 1916    0-10 Pain Scale  3 08/11/18 1252   POSITIONING      Word Pain Scale  0 08/12/18 0724   Body Position  independently positioning 08/15/18 1008    FACES Pain Rating  6-->hurts even more 08/15/18 0424   Head of Bed (HOB)  HOB at 20-30 degrees 08/15/18 0445    Pain Location  Back 08/15/18 0424   Positioning/Transfer Devices  pillows;in use 08/13/18 1916    Pain Orientation  Left 08/14/18 0111  "  Chair  Upright in chair 08/15/18 1000    Pain Descriptors  Burning 08/14/18 0111   DAILY CARE      Pain Intervention(s)  Medication (See eMAR) 08/15/18 0424   Activity Management  activity adjusted per tolerance 08/15/18 1008    Response to Interventions  Absence of nonverbal indicators of pain 08/15/18 0328   Activity Assistance Provided  assistance, 1 person 08/15/18 1008    Additional Documentation  -- (Pt. sleeping) 08/15/18 0328   Assistive Device Utilized  walker;gait belt 08/15/18 1008    ANALGESIA SIDE EFFECTS MONITORING     Additional Documentation  Activity Device Assistance (Row) 08/12/18 0840            Patient Lines/Drains/Airways Status    Active LINES/DRAINS/AIRWAYS     Name: Placement date: Placement time: Site: Days: Last dressing change:    Peripheral IV 08/14/18 Right Lower forearm 08/14/18   2226   Lower forearm   less than 1     Wound 08/12/18 Vagina Other (comment) bruising in vagina 08/12/18   0100   Vagina   3     Rash 08/13/18 0125 Left lower back bulla/blister 08/13/18   0125    2     Rash 08/13/18 1018 Left upper thigh bulla/blister 08/13/18   1018    2             Patient Lines/Drains/Airways Status    Active PICC/CVC     None            Intake/Output Detail Report     Date Intake     Output   Net    Shift P.O. Other IV Piggyback Total Urine Chest Tube Total       Noc 08/13/18 2300 - 08/14/18 0659 -- -- -- -- 850 -- 850 -850    Day 08/14/18 0700 - 08/14/18 1459 -- -- -- -- -- -- -- 0    Rosalee 08/14/18 1500 - 08/14/18 2259 -- -- -- -- -- -- -- 0    Noc 08/14/18 2300 - 08/15/18 0659 -- -- -- -- 900 -- 900 -900    Day 08/15/18 0700 - 08/15/18 1459 340 -- -- 340 900 -- 900 -560      Last Void/BM       Most Recent Value    Urine Occurrence 1 at 08/14/2018 2258    Stool Occurrence 1 at 08/15/2018 0035      Case Management/Discharge Planning     Case Management/Discharge Planning Flowsheet     REFERRAL INFORMATION     ASSESSMENT OF FUNCTIONAL STATUS      Did the Initial Social Work Assessment  result in a Social Work Case?  Yes 08/13/18 0951   Prior to admission patient needed assistance with:  Medications;Meal preparation;Laundry/Housekeeping;Shopping;Transportation 08/13/18 1042    Admission Type  inpatient 08/13/18 0951   COPING/STRESS      Arrived From  home or self-care 08/13/18 0951   Major Change/Loss/Stressor  denies 08/12/18 1336    Referral Source  physician 08/13/18 0951   EXPECTED DISCHARGE      Reason For Consult  discharge planning 08/13/18 0951   [facility]  [facility] 08/14/18 1111    Record Reviewed  history and physical;medical record 08/13/18 0951   DISCHARGE PLANNING      CTS Assigned to Case  Corinne  08/13/18 0951   Patient/family verbalizes understanding of discharge plan recommendations?  Yes 08/13/18 1042    Primary Care Clinic Name  [facility] 08/13/18 0951   Medical Team notified of plan?  yes 08/13/18 1042    Primary Care MD Name  Ceasar Churchill 08/13/18 0951   Anticipated Changes Related to Illness  none 08/13/18 1042    LIVING ENVIRONMENT     Transportation Available  family or friend will provide 08/12/18 1027    Lives With  alone;facility resident 08/13/18 0951   FINAL RESOURCES      Living Arrangements  assisted living 08/13/18 0951   Equipment Currently Used at Home  walker, rolling 08/13/18 1042    Provides Primary Care For  no one 08/13/18 0951   Resources List  Assisted Living ([facility] ) 08/13/18 1042    Quality Of Family Relationships  supportive 08/13/18 0951   Home Care  [facility] [phone], Fax: [phone] 08/13/18 1039    Able to Return to Prior Living Arrangements  yes 08/13/18 0951   ABUSE RISK SCREEN      HOME SAFETY     QUESTION TO PATIENT:  Has a member of your family or a partner(now or in the past) intimidated, hurt, manipulated, or controlled you in any way?  no 08/11/18 0537    Patient Feels Safe Living in Home?  yes 08/13/18 0951   QUESTION TO PATIENT: Do you feel safe going back to the  place where you are living?  yes 08/11/18 0537    ASSESSMENT OF FAMILY/SOCIAL SUPPORT     OBSERVATION: Is there reason to believe there has been maltreatment of a vulnerable adult (ie. Physical/Sexual/Emotional abuse, self neglect, lack of adequate food, shelter, medical care, or financial exploitation)?  no 08/12/18 1338    Marital Status  Single 08/13/18 0951   OTHER      Who is your support system?  Facility resident(s)/Staff 08/13/18 0951   Are you depressed or being treated for depression?  Yes 08/12/18 1335    Description of Support System  Supportive 08/13/18 0951   HOMICIDE RISK      Support Assessment  Adequate family and caregiver support;Adequate social supports 08/13/18 0951   Feels Like Hurting Others  no 08/11/18 0562

## 2018-08-11 NOTE — IP AVS SNAPSHOT
"[place] [address] MEDICAL SURGICAL: [phone]                                              INTERAGENCY TRANSFER FORM - PHYSICIAN ORDERS   2018                    Hospital Admission Date: 2018  GORDY HAMMER   : 1921  Sex: Female        Attending Provider: Debi Medel MD     Allergies:  Sulfa Drugs    Infection:  None   Service:  GENERAL MEDI    Ht:  1.626 m (5' 4\")   Wt:  80.8 kg (178 lb 1.6 oz)   Admission Wt:  54.4 kg (120 lb)    BMI:  30.57 kg/m 2   BSA:  1.91 m 2            Patient PCP Information     Provider PCP Type    Ceasar Churchill MD General      ED Clinical Impression     Diagnosis Description Comment Added By Time Added    Closed fracture of multiple ribs of right side, initial encounter [S22.41XA] Closed fracture of multiple ribs of right side, initial encounter [S22.41XA]  Juan Baeza MD 2018  9:19 AM    Traumatic pneumothorax, initial encounter [S27.0XXA] Traumatic pneumothorax, initial encounter [S27.0XXA]  Juan Baeza MD 2018  9:19 AM      Hospital Problems as of 8/15/2018              Priority Class Noted POA    Pneumothorax, acute Medium  2018 Yes      Non-Hospital Problems as of 8/15/2018     None      Code Status History     Date Active Date Inactive Code Status Order ID Comments User Context    8/15/2018 12:07 PM  DNR/DNI [id number]  Alfred Alicea MD Outpatient    2018  4:09 PM 8/15/2018 12:07 PM DNR/DNI [id number]  Ian Bonilla MD Inpatient    2018 12:25 PM 2018  4:09 PM Full Code [id number]  Debi Medel MD Inpatient         Medication Review      START taking        Dose / Directions Comments    losartan 50 MG tablet   Commonly known as:  COZAAR   Used for:  Essential hypertension        Dose:  50 mg   Start taking on:  2018   Take 1 tablet (50 mg) by mouth daily   Quantity:  30 tablet   Refills:  0        valACYclovir 1000 mg tablet   Commonly known as:  VALTREX "   Indication:  Shingles   Used for:  Herpes zoster dermatitis        Dose:  1000 mg   Take 1 tablet (1,000 mg) by mouth 2 times daily for 4 days   Quantity:  8 tablet   Refills:  0          CONTINUE these medications which have NOT CHANGED        Dose / Directions Comments    acetaminophen 325 MG tablet   Commonly known as:  TYLENOL        Dose:  650 mg   Take 650 mg by mouth 2 times daily as needed for mild pain   Refills:  0        conjugated estrogens cream   Commonly known as:  PREMARIN        Place vaginally as needed   Refills:  0        Dextromethorphan-guaiFENesin  MG/5ML syrup        Dose:  10 mL   Take 10 mLs by mouth every 4 hours as needed for cough   Refills:  0        mineral oil-white petrolatum Crea cream        Apply topically 2 times daily , apply to lower legs until resolved for dry skin   Refills:  0        PATADAY 0.2 % Soln   Generic drug:  olopatadine HCl        Dose:  1 drop   Place 1 drop into both eyes as needed   Refills:  0        * polyethylene glycol Packet   Commonly known as:  MIRALAX/GLYCOLAX        Dose:  1 packet   Take 1 packet by mouth daily   Refills:  0        * polyethylene glycol Packet   Commonly known as:  MIRALAX/GLYCOLAX        Dose:  1 packet   Take 1 packet by mouth as needed for constipation   Refills:  0        RA BUNION CUSHION Pads        Apply topically as needed   Refills:  0        REFRESH 1 % ophthalmic solution   Generic drug:  carboxymethylcellulose        Dose:  3 drop   Place 3 drops into both eyes 2 times daily as needed for dry eyes   Refills:  0        SYNTHROID 75 MCG tablet   Generic drug:  levothyroxine        Dose:  75 mcg   Take 75 mcg by mouth daily   Refills:  0        triamcinolone 0.1 % cream   Commonly known as:  KENALOG        Apply topically 2 times daily as needed for irritation , apply to rash on face, legs & back   Refills:  0        vitamin D 2000 units tablet        Dose:  1 tablet   Take 1 tablet by mouth daily   Refills:  0         ZOLOFT 100 MG tablet   Generic drug:  sertraline        Dose:  100 mg   Take 100 mg by mouth daily   Refills:  0        * Notice:  This list has 2 medication(s) that are the same as other medications prescribed for you. Read the directions carefully, and ask your doctor or other care provider to review them with you.              Further instructions from your care team       HOME CARE FOLLOWING TRAUMA ADMISSION - NONSURGICAL FRACTURES/ABRASIONS  DANAY Hauser, EVERETTE Oneill, SANDRA Mathew    NEXT APPOINTMENT:  Follow-up with your primary care provider in 2-3 days for recheck of your injuries and overall medical status.  At that time you may address ongoing care recommendations and activity restrictions.    WOUND CARE:  Apply bacitracin to abrasions twice a day and cover sites with non-stick dressings afterwards.      ACTIVITY:  Light Activity -- you may immediately be up and about as tolerated.  Driving -- you may drive when comfortable and off narcotic pain medications.  Light Work -- resume when comfortable off pain medications.  (If you can drive, you probably can work.)  Strenuous Work/Activity -- limit lifting to 10 pounds for 2 weeks.  Restrictions after this time should be recommended by your primary care provider.    DISCOMFORT:  Use pain medications as prescribed by your surgeon.  Take the pain medication with some food, when possible, to minimize side effects.  Expect gradual improvement.    DIET:  Return to diet you were on before surgery.  Drink plenty of fluids.  While taking pain medications, increase dietary fiber or add a fiber supplementation like Metamucil or Citrucel to help prevent constipation - a possible side effect of pain medications.    Surgeon office contact number:  Office Phone:  [phone]      FREQUENTLY ASKED QUESTIONS:    Q:  What can I do to minimize constipation (very hard stools, or lack of stools)?  A:  Stay well hydrated.  Increase your dietary fiber  intake or take a fiber supplement -with plenty of water.  Walk around frequently.  You may consider an over-the-counter stool-softener.  Your Pharmacist can assist you with choosing one that is stocked at your pharmacy.  Constipation is also one of the most common side effects of pain medication.  If you are using pain medication, be pro-active and try to PREVENT problems with constipation by taking the steps above BEFORE constipation becomes a problem.    Q:  Why am I having a hard time sleeping now that I am at home?  A:  Many medications you receive while you are in the hospital can impact your sleep for a number of days after your surgery/hospitalization.  Decreased level of activity and naps during the day may also make sleeping at night difficult.  Try to minimize day-time naps, and get up frequently during the day to walk around your home during your recovery time.  Sleep aides may be of some help, but are not recommended for long-term use.            If you have other questions, please call the office Monday thru Friday between 8am and 5pm to discuss with the nurse or physician assistant.  #[phone]    There is a surgeon ON CALL on weekday evenings and over the weekend in case of urgent need only, and may be contacted at the same number.    If you are having an emergency, call 911 or proceed to your nearest emergency department.    CM: [facility] RN/PT/OT       Summary of Visit     Reason for your hospital stay       Fall, Pneumonthorax             After Care     Activity       Your activity upon discharge: activity as tolerated, fall precaution       Diet       Follow this diet upon discharge: Orders Placed This Encounter      Room Service      Combination Diet Regular Diet Adult       Wound care and dressings       Instructions to care for your wound at home: as directed.             Referrals     Home Care OT Referral for Hospital Discharge       OT to eval and treat    Your provider has  ordered home care - occupational therapy. If you have not been contacted within 2 days of your discharge please call the department phone number listed on the top of this document.       Home Care PT Referral for Hospital Discharge       PT to eval and treat    Your provider has ordered home care - physical therapy. If you have not been contacted within 2 days of your discharge please call the department phone number listed on the top of this document.       Home care nursing referral       RN skilled nursing visit. RN to provide personal cares as needed, daily dressing change to previous chest tube site.    Your provider has ordered home care nursing services. If you have not been contacted within 2 days of your discharge please call the inpatient department phone number at [phone] .              MD face to face encounter       Documentation of Face to Face and Certification for Home Health Services    I certify that patient: Coty Trotetr is under my care and that I, or a nurse practitioner or physician's assistant working with me, had a face-to-face encounter that meets the physician face-to-face encounter requirements with this patient on: 8/15/2018.    This encounter with the patient was in whole, or in part, for the following medical condition, which is the primary reason for home health care: rib fractures, pnuemothorax.    I certify that, based on my findings, the following services are medically necessary home health services: Nursing, Occupational Therapy and Physical Therapy.    My clinical findings support the need for the above services because: Nurse is needed: to assess right chest wound., Occupational Therapy Services are needed to assess and treat cognitive ability and address ADL safety due to impairment in mobility.  Physical Therapy Services are needed to assess and treat the following functional impairments: deconditioning, weakness, unsteady ambulation, balance.    Further, I certify  that my clinical findings support that this patient is homebound (i.e. absences from home require considerable and taxing effort and are for medical reasons or [employer] services or infrequently or of short duration when for other reasons) because: Leaving home is medically contraindicated for the following reason(s): Other physician ordered restriction: deconditioning, dementia, imbalance.    Based on the above findings. I certify that this patient is confined to the home and needs intermittent skilled nursing care, physical therapy and/or speech therapy.  The patient is under my care, and I have initiated the establishment of the plan of care.  This patient will be followed by a physician who will periodically review the plan of care.  Physician/Provider to provide follow up care: Ceasar Churchill    Attending hospital physician (the Medicare certified [place] provider): Debi Medel  Physician Signature: See electronic signature associated with these discharge orders.  Date: 8/15/2018                  Radiology & Cardiology Orders     Future Labs/Procedures Complete By Expires    XR Chest 2 Views  8/28/2018 (Approximate) 9/30/2018      Radiology & Cardiology Orders     XR Chest 2 Views                 Follow-Up Appointment Instructions     Future Labs/Procedures    Follow-up and recommended labs and tests      Comments:    Follow up with primary care provider, Ceasar Churchill, within 7 days for hospital follow- up.      Follow-Up Appointment Instructions     Follow-up and recommended labs and tests        Follow up with primary care provider, Ceasar Churchill, within 7 days for hospital follow- up.             Statement of Approval     Ordered          08/15/18 1207  I have reviewed and agree with all the recommendations and orders detailed in this document.  EFFECTIVE NOW     Approved and electronically signed by:  Alfred Alicea MD

## 2018-08-11 NOTE — IP AVS SNAPSHOT
` `           [place] [address] MEDICAL SURGICAL: [phone]                 INTERAGENCY TRANSFER FORM - NOTES (H&P, Discharge Summary, Consults, Procedures, Therapies)   2018                    Hospital Admission Date: 2018  GORDY HAMMER   : 1921  Sex: Female        Patient PCP Information     Provider PCP Type    Ceasar Churchill MD General         History & Physicals      H&P by Debi Medel MD at 2018 10:24 AM     Author:  Debi Medel MD Service:  Surgery Author Type:  Physician    Filed:  2018  2:08 PM Date of Service:  2018 10:24 AM Creation Time:  2018 10:21 AM    Status:  Signed :  Debi Medel MD (Physician)         [facility]   Trauma Surgical H&P           Assessment and Plan:   Assessment:   89 year old female who presents after a same level fall.  Acute traumatic injuries by imaging: Nondisplaced fractures of the right posterior fourth and  fifth ribs with associated moderate right pneumothorax.    This case was discussed with ED physician, Dr. Hernandez.  Thoracic surgery aware of the patient and will consult.      Plan:   Admit to trauma service, inpatient status.  Pain control.  Incentive spirometer instruction and use.  pulm toilet.    Consults: thoracic surgery, hospitalist, PT/OT.                Chief Complaint:[EG1.1]   Same level fall     History is obtained from the patient and the EMR.[EG1.2]         History of Present Illness:   This patient is a 89 year old[EG1.1] [EG1.2] female[EG1.1], SNF resident, h/o dementia[EG1.2] who presented to the ED after[EG1.1] same level fall.  Per the EMR she fell when walking back to bed.  She complained of right sided chest pain, worse with breathing.  Currently she denies pain and appears comfortable[EG1.2].[EG1.1]     Unknown if[EG1.2] loss of consciousnes[EG1.1]s[EG1.2].  Anticoagulation:[EG1.1] No[EG1.2]      History of syncope:[EG1.1] unknown  History of  falls:  unknown[EG1.2].      Denies shortness of breath, chest pain, abdominal pain, nausea, emesis, dizziness, visual changes, headache, neck pain, back pain, extremity pain.               Past Medical History:    has no past medical history on file.          Past Surgical History:   No past surgical history on file.            Social History:     Social History   Substance Use Topics     Smoking status: Not on file     Smokeless tobacco: Not on file     Alcohol use Not on file             Family History:   Noncontributory       Allergies:[EG1.1]   All allergies reviewed and addressed[EG1.2]          Medications:     No current facility-administered medications on file prior to encounter.   No current outpatient prescriptions on file prior to encounter.    ketamine (5 mg/mL)-propofol (5 mg/mL) 1:1 for injection  0.025-0.3 mL/kg Intravenous Once     lidocaine                Review of Systems:   The 10 point review of systems is negative other than noted in the HPI.           Physical Exam:   BP (!) 172/98  Pulse 72  Temp 97.8  F (36.6  C) (Oral)  Resp 20  Wt 54.4 kg (120 lb)  SpO2 98%  General appearance: well-nourished, no apparent distress  HEENT: Pupils are equal and round.  Head is normocephalic and atraumatic.  Neck is without thyromegaly, masses, swelling, ecchymosis.  TTP[EG1.1] none[EG1.2].  Chest:  Clear to auscultation bilaterally.  Heart with regular rate and rhythm, no murmurs.  No palpable swelling, masses, ecchymosis, no crepitus, no visible deformity.  Abdomen:  Nondistended, soft, nontender to palpation.  No masses.  Back: no palpable masses or visible deformity.  TTP[EG1.1] none[EG1.2].  Extremities: Warm without edema.  Strength[EG1.1] 4+ all four extremities.[EG1.2]  Neurologic: nonfocal, grossly intact times four extremities, alert and oriented times three.  Cranial nerves II through XII intact grossly.  Psychiatric: mood and affect are appropriate.  Skin: without jaundice, lesions, rashes.   Abrasions[EG1.1] none[EG1.2].  Ecchymosis[EG1.1] none[EG1.2] as.           Data:   WBC -   WBC   Date Value Ref Range Status   08/11/2018 13.4 (H) 4.0 - 11.0 10e9/L Final   ], HgB - Hemoglobin   Date Value Ref Range Status   08/11/2018 13.5 11.7 - 15.7 g/dL Final   ]   Liver Function Studies - No results for input(s): PROTTOTAL, ALBUMIN, BILITOTAL, ALKPHOS, AST, ALT, BILIDIRECT in the last 20247 hours.      IMAGING:  Results for orders placed or performed during the hospital encounter of 08/11/18   Head CT w/o contrast    Narrative    CT HEAD W/O CONTRAST 8/11/2018 6:32 AM    HISTORY: Fall, evaluate for intracranial hemorrhage.    COMPARISON: 11/25/2015    TECHNIQUE: Noncontrast head CT.  Radiation dose for this scan was  reduced using automated exposure control, adjustment of the mA and/or  kV according to patient size, or iterative reconstruction technique.    FINDINGS: No intracranial hemorrhage. No abnormal extra-axial fluid  collection. Midline is maintained. Ventricular volumes are stable.  Chronic generalized atrophy. Calvarium is intact. Sinuses and mastoid  air cells are normally aerated.      Impression    IMPRESSION: No acute abnormality.    MYAH DAVID MD   Cervical spine CT w/o contrast    Narrative    CT CERVICAL SPINE W/O CONTRAST 8/11/2018 6:40 AM    HISTORY: Fall, evaluate for fracture.    COMPARISON: 11/25/2015    TECHNIQUE: Noncontrast cervical spine CT.  Radiation dose for this  scan was reduced using automated exposure control, adjustment of the  mA and/or kV according to patient size, or iterative reconstruction  technique.    FINDINGS: No acute fracture or malalignment. Chronic degenerative  changes at C5-C6-C7, stable in comparison with 11/25/2015.  Prevertebral soft tissues are normal. No acute soft tissue  abnormality.      Impression    IMPRESSION: No acute abnormality.    MYAH DAVID MD   CT Thoracic Spine w/o Contrast    Narrative    CT THORACIC SPINE W/O CONTRAST 8/11/2018 6:47  AM    HISTORY: Tenderness after fall.    COMPARISON: None.    TECHNIQUE: Noncontrast thoracic spine CT.  Radiation dose for this  scan was reduced using automated exposure control, adjustment of the  mA and/or kV according to patient size, or iterative reconstruction  technique.    FINDINGS: Nondisplaced fractures of the right posterior fourth and  fifth ribs and moderate right pneumothorax are better visualized on  dedicated chest CT. Thoracic vertebral bodies appear intact. No  malalignment. Multilevel loss of disc space associated with marginal  osteophyte formation.       Impression    IMPRESSION:   1. Degenerative change in the thoracic spine without acute thoracic  spine abnormality.  2. Nondisplaced fractures of the right posterior fourth and fifth ribs  with moderate right pneumothorax, better visualized on dedicated chest  CT.    MYAH DAVID MD   CT Chest w/o Contrast    Narrative    CT CHEST W/O CONTRAST 8/11/2018 6:40 AM    HISTORY: Fall, rib pain.    COMPARISON: None.    TECHNIQUE: Noncontrast chest CT.  Radiation dose for this scan was  reduced using automated exposure control, adjustment of the mA and/or  kV according to patient size, or iterative reconstruction technique.    FINDINGS: Nondisplaced fractures of the right posterior fourth and  fifth ribs. Moderate right pneumothorax. Left-sided ribs appear intact  as do the remaining visualized osseous structures.    Moderate cardiomegaly without pericardial effusion. Patchy coronary  vascular calcification. No mediastinal or hilar adenopathy.    No acute abnormality in the visualized upper abdomen.      Impression    IMPRESSION: Nondisplaced fractures of the right posterior fourth and  fifth ribs with associated moderate right pneumothorax.    MYAH DAVID MD   Chest XR,  PA & LAT    Narrative    XR CHEST 2 VW 8/11/2018 7:31 AM    HISTORY: Pneumothorax.    COMPARISON: None.      Impression    IMPRESSION: Mild to moderate right apical pneumothorax.  The left lung  appears clear. No pleural effusions appreciated. Mild cardiomegaly.    KURT ESPARZA MD   Chest  XR, 1 view PORTABLE    Narrative    CHEST ONE VIEW PORTABLE  8/11/2018 9:21 AM     HISTORY:  Chest tube placement.      COMPARISON: Earlier today.    FINDINGS: Mild elevation of the left hemidiaphragm. Superior  translation of the left humeral head, presumably related to rotator  cuff pathology.      Impression    IMPRESSION:  Placement of a right chest tube. Decreased right  pneumothorax, currently small.    JONG MARRERO MD       This note was created using voice recognition software. Undetected word substitutions or other errors may have occurred.     Debi Medel MD    Time spent with the patient, reviewing the EMR, reviewing laboratory and imaging studies, more than 50% of which was counseling and coordinating care:[EG1.1]  35[EG1.2] minutes.[EG1.1]         Revision History        User Key Date/Time User Provider Type Action    > EG1.2 8/11/2018  2:08 PM Debi Medel MD Physician Sign     EG1.1 8/11/2018 10:21 AM Debi Medel MD Physician                   Discharge Summaries     No notes of this type exist for this encounter.         Consult Notes      Consults by White, Corinne C, LSW at 8/13/2018 10:48 AM     Author:  White, Corinne C, LSW Service:  (none) Author Type:      Filed:  8/13/2018 10:48 AM Date of Service:  8/13/2018 10:48 AM Creation Time:  8/13/2018 10:42 AM    Status:  Signed :  White, Corinne C, LSW ()         Care Transition Initial Assessment -   Reason For Consult: discharge planning  Met with: Patient and Family  Spoke with daughter Jaqueline   Active Problems:    Pneumothorax, acute       DATA  Lives With: alone, facility resident  Living Arrangements: assisted living [facility]   Description of Support System: Supportive  Who is your support system?: Facility resident(s)/Staff  Support Assessment: Adequate family  and caregiver support, Adequate social supports. Pt has support for meals, meds, cleaning and escort.   Identified issues/concerns regarding health management: Pt admitted from her [facility] due to Pneumothorax.. Pt is moving well with staff.       Resources List: Assisted Living ([facility] )  Spoke with mauro FRASER at [facility] [phone] fax [phone]     Quality Of Family Relationships: supportive  Transportation Available: family or friend will provide  Family have requested WC transport.   ASSESSMENT  Cognitive Status:  Awake- Pt does have dementia  Concerns to be addressed: spoke with staff at [facility]. They would agree that pt does need increased support and would welcome home care and would prefer [facility]. Pt does have a walker but refused to use it when at home.. Spoke with daughter who is agreeable to home care as well. She has requested WC transport at  due to her dementia tends to be angry at her daughter all the time.       PLAN  Will refer to [facility] at [place] for RN.PT./OT.HHA support  HE transport will fax IN order to geovanny Fraser at [facility][CW1.1]      Revision History        User Key Date/Time User Provider Type Action    > CW1.1 8/13/2018 10:48 AM White, Corinne C, LSW  Sign            Consults by Johnnie Jacome MD at 8/11/2018  5:00 PM     Author:  Johnnie Jacome MD Service:  Thoracic Surgery Author Type:  Physician    Filed:  8/11/2018  5:33 PM Date of Service:  8/11/2018  5:00 PM Creation Time:  8/11/2018  5:28 PM    Status:  Signed :  Johnnie Jacome MD (Physician)     Consult Orders:    1. Thoracic Surgery IP Consult: Patient to be seen: Routine - within 24 hours; pnuemothorax; Consultant may enter orders: Yes [[id number]] ordered by Debi Medel MD at 08/11/18 1109                THORACIC SURGERY CONSULT NOTE    Consult Reason:  Closed rib fractures (2); traumatic pneumothorax    HPI: This is a 89 year old woman who fell at her assisted living facility.  She  was found to have closed right 4th and 5th rib fractures with an associated pneumothorax.  The lung expanded after chest tube insertion.  The patient is somewhat confused and does not know how the fall occurred.  She has dementia.    A/P: Patient is a 89 year old female with closed rib fractures (right 4th and 5th) and a traumatic pneumothorax.  There is no air leak.  - Chest tube to water seal  - CXR in AM.  Hope to remove tube tomorrow.  - Pain control  - Incentive spirometry  - Ambulation  - PT    I spent a total of 10 minutes with the patient, more than 50% of which were spent in counseling, coordination of care, and face-to-face time.      Thank you for the opportunity to participate in the care of this patient.    PMH:  Hypothyroidism  Dementia  GERD    FH:    Noncontributory per chart    SH:  Unknown    Allergies:  Allergies   Allergen Reactions     Sulfa Drugs        Home Meds:  Prescriptions Prior to Admission   Medication Sig Dispense Refill Last Dose     acetaminophen (TYLENOL) 325 MG tablet Take 650 mg by mouth 2 times daily as needed for mild pain   8/10/2018 at hs     carboxymethylcellulose (REFRESH) 1 % ophthalmic solution Place 3 drops into both eyes 2 times daily as needed for dry eyes   prn     Cholecalciferol (VITAMIN D) 2000 units tablet Take 1 tablet by mouth daily   8/10/2018 at am     conjugated estrogens (PREMARIN) cream Place vaginally as needed   prn     Dextromethorphan-guaiFENesin  MG/5ML syrup Take 10 mLs by mouth every 4 hours as needed for cough   prn     Foot Care Products (RA BUNION CUSHION) PADS Apply topically as needed   prn     levothyroxine (SYNTHROID) 75 MCG tablet Take 75 mcg by mouth daily   8/10/2018 at hs     mineral oil-white petrolatum (MINERIN/EUCERIN) CREA cream Apply topically 2 times daily , apply to lower legs until resolved for dry skin   8/10/2018 at hs     olopatadine HCl (PATADAY) 0.2 % SOLN Place 1 drop into both eyes as needed   prn     polyethylene  glycol (MIRALAX/GLYCOLAX) Packet Take 1 packet by mouth daily   8/10/2018 at hs     polyethylene glycol (MIRALAX/GLYCOLAX) Packet Take 1 packet by mouth as needed for constipation   prn     sertraline (ZOLOFT) 100 MG tablet Take 100 mg by mouth daily   8/10/2018 at am     triamcinolone (KENALOG) 0.1 % cream Apply topically 2 times daily as needed for irritation , apply to rash on face, legs & back   prn           Physical Exam:  Temp:  [96  F (35.6  C)-97.8  F (36.6  C)] 96  F (35.6  C)  Pulse:  [72] 72  Heart Rate:  [59-79] 66  Resp:  [11-28] 20  BP: (110-213)/() 153/50  SpO2:  [93 %-99 %] 99 %    No air leak    Labs:  CBC  Recent Labs  Lab 08/11/18  0753   WBC 13.4*   HGB 13.5        BMP  Recent Labs  Lab 08/11/18  0753      POTASSIUM 4.1   CHLORIDE 103   CO2 26   BUN 21   CR 0.96   *     LFT  Recent Labs  Lab 08/11/18  0753   INR 0.92       Imaging:  I reviewed several xrays and a CT chest showing the right 4th and 5th rib fractures, the pneumothorax and the reexpanded lung after chest tube insertion.[AB1.1]          Johnnie Jacome        Revision History        User Key Date/Time User Provider Type Action    > AB1.1 8/11/2018  5:33 PM Johnnie Jacome MD Physician Sign            Consults by Ian Bonilla MD at 8/11/2018  3:47 PM     Author:  Ian Bonilla MD Service:  Hospitalist Author Type:  Physician    Filed:  8/11/2018  4:05 PM Date of Service:  8/11/2018  3:47 PM Creation Time:  8/11/2018  3:47 PM    Status:  Signed :  Ian Bonilla MD (Physician)     Consult Orders:    1. Hospitalist IP Consult: Patient to be seen: Routine - within 24 hours; assist with medical management; Consultant may enter orders: Yes [[id number]] ordered by Debi Medel MD at 08/11/18 1109                      Hospitalist Consultation      Cotyveronica Trotetr MRN# [id number]   YOB: 1921 Age: 89 year old   Date of Admission: 8/11/2018     Requesting  Physician: Dr. Medel   Reason for consult: Medical Management           Assessment and Plan:     89-year-old woman with dementia lives in assisted living relatively healthy fell going to bed at night, sustained a right chest traumatic injury with fourth and fifth rib fractures, and a moderate pneumothorax on the right side.  Treated in the emergency room with a chest tube and admitted for further management.  We were consulted to medically manage the patient with her surgeon.        1. Right fourth and fifth rib fracture complicated by pneumothorax, chest tube management by surgery    2. Mechanical fall, patient has prior history, ED visit in 2015 for mechanical fall, no clear syncope or chest pain or neurological symptoms prior to her fall    3. Hypertension, patient has history in her record of hypertension, I spoke with her daughter who confirmed that the patient is on losartan 50 mg daily which will continue here in the hospital    4. Dementia, watch for delirium especially with narcotic use.  According to her daughter patient can become aggressive with delirium especially in a strange apartment.  Delirium protocol will be initiated, also if needed can have a small dose of 12.5 mg of Seroquel at bedtime    Encourage incentive spirometer use  Physical therapy  consulted  DVT prophylaxis with pneumatic devices   CODE STATUS discussed with the daughter, patient is definitely DNR/DNI according to her daughter and caregiver  Expected stay likely 2-3 days, defer to primary team           History of Present Illness:   This patient is a 89 year old female who presents with with history of dementia, relatively healthy otherwise, she was going back to bed at night when she fell and sustained right chest injury resulting in multiple rib fractures and pneumothorax.  Denies complaints to me, she is not sure why she is in the hospital, she remembers that she felt, however she does not remember that she was told  she has repeat fractures.  Denies any fever and chills any cough.  She does have pain at the site of the chest tube.  Review of system is limited by patient memory              Past Medical History:   Hypothyroidism  Dementia  GERD  Prior history of hypertension in her record, she denied            Social History:   Denies smoking, denies alcohol use         Family History:   Reviewed noncontributory          Allergies:     Allergies   Allergen Reactions     Sulfa Drugs              Medications:     Prescriptions Prior to Admission   Medication Sig Dispense Refill Last Dose     acetaminophen (TYLENOL) 325 MG tablet Take 650 mg by mouth 2 times daily as needed for mild pain   8/10/2018 at hs     carboxymethylcellulose (REFRESH) 1 % ophthalmic solution Place 3 drops into both eyes 2 times daily as needed for dry eyes   prn     Cholecalciferol (VITAMIN D) 2000 units tablet Take 1 tablet by mouth daily   8/10/2018 at am     conjugated estrogens (PREMARIN) cream Place vaginally as needed   prn     Dextromethorphan-guaiFENesin  MG/5ML syrup Take 10 mLs by mouth every 4 hours as needed for cough   prn     Foot Care Products (RA BUNION CUSHION) PADS Apply topically as needed   prn     levothyroxine (SYNTHROID) 75 MCG tablet Take 75 mcg by mouth daily   8/10/2018 at hs     mineral oil-white petrolatum (MINERIN/EUCERIN) CREA cream Apply topically 2 times daily , apply to lower legs until resolved for dry skin   8/10/2018 at hs     olopatadine HCl (PATADAY) 0.2 % SOLN Place 1 drop into both eyes as needed   prn     polyethylene glycol (MIRALAX/GLYCOLAX) Packet Take 1 packet by mouth daily   8/10/2018 at hs     polyethylene glycol (MIRALAX/GLYCOLAX) Packet Take 1 packet by mouth as needed for constipation   prn     sertraline (ZOLOFT) 100 MG tablet Take 100 mg by mouth daily   8/10/2018 at am     triamcinolone (KENALOG) 0.1 % cream Apply topically 2 times daily as needed for irritation , apply to rash on face, legs & back  "  prn               Review of Systems:   A comprehensive greater than 10 system review of systems was carried out.  Pertinent positives and negatives are noted above.  Otherwise negative for contributory info.            Physical Exam:   Vitals were reviewed  Blood pressure 153/50, pulse 72, temperature 96  F (35.6  C), temperature source Oral, resp. rate 20, height 1.626 m (5' 4\"), weight 80.8 kg (178 lb 1.6 oz), SpO2 99 %.  Exam:   GENERAL:  Comfortable.  PSYCH: pleasant, oriented x1, No acute distress.  EYES: PERRLA, Normal conjunctiva.  HEART:  Normal S1, S2 with no edema.  LUNGS:  Clear to auscultation, normal Respiratory effort.  ABDOMEN:  Soft, no hepatosplenomegaly, normal bowel sounds.  SKIN:  Dry to touch, No rash.             Data:   Past 24 hours labs, studies, and imaging were reviewed.       Lab Results   Component Value Date     08/11/2018    Lab Results   Component Value Date    CHLORIDE 103 08/11/2018    Lab Results   Component Value Date    BUN 21 08/11/2018      Lab Results   Component Value Date    POTASSIUM 4.1 08/11/2018    Lab Results   Component Value Date    CO2 26 08/11/2018    Lab Results   Component Value Date    CR 0.96 08/11/2018        Lab Results   Component Value Date    WBC 13.4 (H) 08/11/2018    HGB 13.5 08/11/2018    HCT 42.4 08/11/2018     (H) 08/11/2018     08/11/2018[FC1.1]        Revision History        User Key Date/Time User Provider Type Action    > FC1.1 8/11/2018  4:05 PM Ian Bonilla MD Physician Sign            Consults by Rylee Piper at 8/11/2018  2:13 PM     Author:  Rylee Piper Service:  (none) Author Type:      Filed:  8/11/2018  2:13 PM Date of Service:  8/11/2018  2:13 PM Creation Time:  8/11/2018  2:12 PM    Status:  Signed :  Rylee Piper ()         SWS aware of consult. Will continue to follow pt and chart as medical POC is developed and discharge needs are clearer.  " PT/OT also consulted.[KC1.1]      Revision History        User Key Date/Time User Provider Type Action    > KC1.1 8/11/2018  2:13 PM Rylee Piper  Sign                     Progress Notes - Physician (Notes from 08/12/18 through 08/15/18)      Progress Notes by White, Corinne C, LSW at 8/15/2018 12:56 PM     Author:  White, Corinne C, LSW Service:  (none) Author Type:      Filed:  8/15/2018  1:02 PM Date of Service:  8/15/2018 12:56 PM Creation Time:  8/15/2018 12:56 PM    Status:  Addendum :  White, Corinne C, LSW ()         Discharge Planner[CW1.1]   Discharge Plans in progress: Spoke with LPN at [facility]. They are aware that pt will return today and they understands pt is being treated for Shingles  Barriers to discharge plan: [facility] would like new meds filled and sent with   Follow up plan:[CW1.2]      08/13/18 1000   Final Resources   Resources List Assisted Living  ([facility] )   Home Care [facility] [phone], Fax: [phone][CW1.1]   kyrie faxed to [phone]  H/E transport[CW1.2] 9585[CW1.3]       Entered by:[CW1.2] Corinne C. White 08/15/2018 12:57 PM[CW1.1]          Revision History        User Key Date/Time User Provider Type Action    > CW1.3 8/15/2018  1:02 PM White, Corinne C, LSW  Addend     CW1.1 8/15/2018 12:59 PM White, Corinne C, LSW  Sign     CW1.2 8/15/2018 12:56 PM White, Corinne C, LSW              Progress Notes by Debi Medel MD at 8/15/2018 11:38 AM     Author:  Debi Medel MD Service:  Surgery Author Type:  Physician    Filed:  8/15/2018 11:47 AM Date of Service:  8/15/2018 11:38 AM Creation Time:  8/15/2018 11:38 AM    Status:  Addendum :  Debi Medel MD (Physician)         [facility]  General Surgery Progress Note           Assessment and Plan:   Assessment:   Trauma admission  Acute traumatic injuries: non displaced  "fractures of right posterior 4th and 5th ribs with right pneumothorax  Dementia  Afebrile      Plan:   DC back to home [facility] with home health aide and PT/OT  Rx Senokot BID  IM to complete remainder of med rec         Interval History:   Comfortable in chair. Reports pain only when coughing. Denies shortness of breath or difficulty breathing. Has been up walking with PT/OT. Tolerating regular diet.         Physical Exam:   Blood pressure 136/61, pulse 80, temperature 97.7  F (36.5  C), temperature source Oral, resp. rate 18, height 1.626 m (5' 4\"), weight 80.8 kg (178 lb 1.6 oz), SpO2 96 %.    I/O last 3 completed shifts:  In: -   Out: 900 [Urine:900]    General: alert, cooperative, pleasant  HEENT: head normocephalic without ecchymosis  Chest: clear to auscultation, heart RRR, Without crepitus or swelling, no ecchymosis or deformity. Dressing at previous chest tube site in place on right side.  Abdomen: soft, nondistended, non tender, without masses, +BS          Data:     Chest Xray 8/12/18: IMPRESSION: Interval removal of right chest tube. There is a small  right apical pneumothorax measuring approximately 1.3 cm to the lung  apex. Streaky atelectasis or scarring of the left lung base persists,  with likely small left effusion.      Recent Labs  Lab 08/13/18  0714 08/11/18  0753   WBC 7.6 13.4*   HGB 11.3* 13.5   HCT 35.6 42.4   * 105*    265       Recent Labs  Lab 08/11/18  0753      POTASSIUM 4.1   CHLORIDE 103   CO2 26   ANIONGAP 6   *   BUN 21   CR 0.96   GFRESTIMATED 54*   GFRESTBLACK 65   KEVIN 9.0       Flory Carbajal PA-C[JK1.1]     The patient has been seen and examined by me.  I agree with the above assessment and plan.  Debi Medel MD[EG1.1]         Revision History        User Key Date/Time User Provider Type Action    > EG1.1 8/15/2018 11:47 AM Debi Medel MD Physician Addend     JK1.1 8/15/2018 11:41 AM Flory Carbajal PA-C Physician Assistant " Sign            Progress Notes by Brianna Floyd PA-C at 8/15/2018 11:24 AM     Author:  Brianna Floyd PA-C Service:  Thoracic Surgery Author Type:  Physician Assistant Guerrero FLETCHER    Filed:  8/15/2018 11:27 AM Date of Service:  8/15/2018 11:24 AM Creation Time:  8/15/2018 11:24 AM    Status:  Signed :  Brianna Floyd PA-C (Physician Assistant - JUSTINE)         Ok to discharge from Thoracic surgery perspective. Will set up f/u in clinic with CXR in 2 weeks. I will contact our coordinators to call patient to schedule f/u. Discussed with Dr Johnnie Jacome.           Brianna Young PA-C  Thoracic Surgery  [facility][AK1.1]       Revision History        User Key Date/Time User Provider Type Action    > AK1.1 8/15/2018 11:27 AM Brianna Floyd PA-C Physician Assistant - JUSTINE Sign            Progress Notes by Alfred Alicea MD at 8/14/2018  8:21 AM     Author:  Alfred Alicea MD Service:  Hospitalist Author Type:  Physician    Filed:  8/14/2018  5:06 PM Date of Service:  8/14/2018  8:21 AM Creation Time:  8/14/2018  8:21 AM    Status:  Signed :  Alfred Alicea MD (Physician)         [facility]  Hospitalist Progress Note  Alfred Alicea MD 08/14/2018    Reason for Stay (Diagnosis): Mechanical fall, with trauma to chest/rib fracture.         Assessment and Plan:      Summary of Stay: Coty Trotter is a 89 year old female from an assisted living facility, who fell down while going to bed sustained trauma to right chest from 12 4 was in the fifth rib fracture and a moderate pneumothorax on the right side admitted on 8/11/2018 with right fourth and fifth rib fracture with traumatic pneumothorax.    Problem List:   1. Traumatic pneumothorax secondary to right fourth and fifth rib fracture.  -Chest tube management per surgery, is removed today.  -Pain control.  -Continue to monitor in the  "hospital  -Ambulate the patient, incentive spirometry.    2. Mechanical fall resulting in rib fracture.  -Fall precaution, PT[AO1.1]/[AO1.2]OT evaluation as chest tube is removed.    3. Hypertension.  - Monitor vitals,[AO1.1] c[AO1.2]ontinue losartan 50 mg daily.    4. Dementia- Patient has significant dementia, she is not oriented to time place person, she is pleasantly confused.[AO1.1]    -[AO1.2] She has no agitation or anxiety   -She has video monitor, at this point no need for a sitter.  -Fall precautions.    5.[AO1.1]  Herpes zoster- Rr[AO1.2]lesli with Blister sacral area[AO1.1] on the right side, and it follows a dermatome, S2,[AO1.2] Small blisters overlying erythematous base[AO1.1].[AO1.2]  - Continue Valtrex    # Pain Assessment:  Current Pain Score 8/14/2018   Patient currently in pain? yes   Pain score (0-10) -   Pain location Back   Pain descriptors -   Coty efrro pain level was assessed and she currently denies pain.      DVT Prophylaxis: Pneumatic Compression Devices  Code Status: Full Code  Discharge Dispo: Patient lives in an assisted facility, alone, needs support.  Estimated Disch Date / # of Days until Disch: 1 day[AO1.1] to assisted-living facility with PT/OT and home health aide[AO1.2].        Interval History (Subjective):      Patient seen and examined, no new overnight issues, no pain, nausea or vomiting.  Patient is still confused,[AO1.1] does not give details or answers to particular questions,[AO1.2] but cooperative and pleasant.                    Physical Exam:      Last Vital Signs:  /65 (BP Location: Right arm)  Pulse 66  Temp 96.4  F (35.8  C) (Oral)  Resp 18  Ht 1.626 m (5' 4\")  Wt 80.8 kg (178 lb 1.6 oz)  SpO2 99%  BMI 30.57 kg/m2    I/O last 3 completed shifts:  In: 490 [P.O.:490]  Out: 1000 [Urine:1000]  Wt Readings from Last 1 Encounters:   08/11/18 80.8 kg (178 lb 1.6 oz)     Current Facility-Administered Medications   Medication     acetaminophen (TYLENOL) tablet " 650 mg     alum & mag hydroxide-simethicone (MYLANTA ES/MAALOX  ES) suspension 30 mL     bisacodyl (DULCOLAX) EC tablet 5 mg    Or     bisacodyl (DULCOLAX) EC tablet 10 mg    Or     bisacodyl (DULCOLAX) EC tablet 15 mg     bisacodyl (DULCOLAX) Suppository 10 mg     HYDROmorphone (PF) (DILAUDID) injection 0.2 mg     labetalol (NORMODYNE/TRANDATE) injection 10 mg     levothyroxine (SYNTHROID/LEVOTHROID) tablet 75 mcg     lidocaine (LMX4) kit     lidocaine 1 % 1 mL     losartan (COZAAR) tablet 50 mg     magnesium sulfate 4 g in 100 mL sterile water (premade)     melatonin tablet 1 mg     naloxone (NARCAN) injection 0.1-0.4 mg     ondansetron (ZOFRAN-ODT) ODT tab 4 mg    Or     ondansetron (ZOFRAN) injection 4 mg     oxyCODONE IR (ROXICODONE) tablet 5-10 mg     polyethylene glycol (MIRALAX/GLYCOLAX) Packet 17 g     polyethylene glycol (MIRALAX/GLYCOLAX) Packet 17 g     potassium chloride (KLOR-CON) Packet 20-40 mEq     potassium chloride 10 mEq in 100 mL intermittent infusion with 10 mg lidocaine     potassium chloride 10 mEq in 100 mL sterile water intermittent infusion (premix)     potassium chloride SA (K-DUR/KLOR-CON M) CR tablet 20-40 mEq     prochlorperazine (COMPAZINE) injection 5 mg    Or     prochlorperazine (COMPAZINE) tablet 5 mg    Or     prochlorperazine (COMPAZINE) Suppository 12.5 mg     senna-docusate (SENOKOT-S;PERICOLACE) 8.6-50 MG per tablet 1 tablet    Or     senna-docusate (SENOKOT-S;PERICOLACE) 8.6-50 MG per tablet 2 tablet     sertraline (ZOLOFT) tablet 100 mg     sodium chloride (PF) 0.9% PF flush 3 mL     sodium chloride (PF) 0.9% PF flush 3 mL     valACYclovir (VALTREX) tablet 1,000 mg       Constitutional: Awake, alert, cooperative, no apparent distress   Respiratory: Clear to auscultation bilaterally, no crackles or wheezing   Cardiovascular: Regular rate and rhythm, normal S1 and S2, and no murmur noted   Abdomen: Normal bowel sounds, soft, non-distended, non-tender   Skin:[AO1.1] Positive  rash sacral area to the right following dermatome S2, with small blisters on erythematous base,[AO1.2] no cyanosis[AO1.1] or clubbing[AO1.2], dry to touch   Neuro: Alert and oriented x[AO1.1]1[AO1.2], no weakness, numbness,[AO1.1] ++[AO1.2]memory loss[AO1.1].[AO1.2]   Extremities: No edema, normal range of motion   Other(s):HEENT Pink, un icteric scleral[AO1.1], moist oral mucosa[AO1.2]       All other systems: Negative          Medications:      All current medications were reviewed with changes reflected in problem list.         Data:      All new lab and imaging data was reviewed.   Labs:  No results for input(s): CULT in the last 168 hours.    Recent Labs  Lab 08/11/18  0753      POTASSIUM 4.1   CHLORIDE 103   CO2 26   ANIONGAP 6   *   BUN 21   CR 0.96   GFRESTIMATED 54*   GFRESTBLACK 65   KEVIN 9.0       Recent Labs  Lab 08/13/18  0714 08/11/18  0753   WBC 7.6 13.4*   HGB 11.3* 13.5   HCT 35.6 42.4   * 105*    265       Recent Labs  Lab 08/11/18  0753   *      Imaging:   Results for orders placed or performed during the hospital encounter of 08/11/18   Head CT w/o contrast    Narrative    CT HEAD W/O CONTRAST 8/11/2018 6:32 AM    HISTORY: Fall, evaluate for intracranial hemorrhage.    COMPARISON: 11/25/2015    TECHNIQUE: Noncontrast head CT.  Radiation dose for this scan was  reduced using automated exposure control, adjustment of the mA and/or  kV according to patient size, or iterative reconstruction technique.    FINDINGS: No intracranial hemorrhage. No abnormal extra-axial fluid  collection. Midline is maintained. Ventricular volumes are stable.  Chronic generalized atrophy. Calvarium is intact. Sinuses and mastoid  air cells are normally aerated.      Impression    IMPRESSION: No acute abnormality.    MYAH DAVID MD   Cervical spine CT w/o contrast    Narrative    CT CERVICAL SPINE W/O CONTRAST 8/11/2018 6:40 AM    HISTORY: Fall, evaluate for fracture.    COMPARISON:  11/25/2015    TECHNIQUE: Noncontrast cervical spine CT.  Radiation dose for this  scan was reduced using automated exposure control, adjustment of the  mA and/or kV according to patient size, or iterative reconstruction  technique.    FINDINGS: No acute fracture or malalignment. Chronic degenerative  changes at C5-C6-C7, stable in comparison with 11/25/2015.  Prevertebral soft tissues are normal. No acute soft tissue  abnormality.      Impression    IMPRESSION: No acute abnormality.    MYAH DAVID MD   CT Thoracic Spine w/o Contrast    Narrative    CT THORACIC SPINE W/O CONTRAST 8/11/2018 6:47 AM    HISTORY: Tenderness after fall.    COMPARISON: None.    TECHNIQUE: Noncontrast thoracic spine CT.  Radiation dose for this  scan was reduced using automated exposure control, adjustment of the  mA and/or kV according to patient size, or iterative reconstruction  technique.    FINDINGS: Nondisplaced fractures of the right posterior fourth and  fifth ribs and moderate right pneumothorax are better visualized on  dedicated chest CT. Thoracic vertebral bodies appear intact. No  malalignment. Multilevel loss of disc space associated with marginal  osteophyte formation.       Impression    IMPRESSION:   1. Degenerative change in the thoracic spine without acute thoracic  spine abnormality.  2. Nondisplaced fractures of the right posterior fourth and fifth ribs  with moderate right pneumothorax, better visualized on dedicated chest  CT.    MYAH DAVID MD   CT Chest w/o Contrast    Narrative    CT CHEST W/O CONTRAST 8/11/2018 6:40 AM    HISTORY: Fall, rib pain.    COMPARISON: None.    TECHNIQUE: Noncontrast chest CT.  Radiation dose for this scan was  reduced using automated exposure control, adjustment of the mA and/or  kV according to patient size, or iterative reconstruction technique.    FINDINGS: Nondisplaced fractures of the right posterior fourth and  fifth ribs. Moderate right pneumothorax. Left-sided ribs appear  intact  as do the remaining visualized osseous structures.    Moderate cardiomegaly without pericardial effusion. Patchy coronary  vascular calcification. No mediastinal or hilar adenopathy.    No acute abnormality in the visualized upper abdomen.      Impression    IMPRESSION: Nondisplaced fractures of the right posterior fourth and  fifth ribs with associated moderate right pneumothorax.    MYAH DAVID MD   Chest XR,  PA & LAT    Narrative    XR CHEST 2 VW 8/11/2018 7:31 AM    HISTORY: Pneumothorax.    COMPARISON: None.      Impression    IMPRESSION: Mild to moderate right apical pneumothorax. The left lung  appears clear. No pleural effusions appreciated. Mild cardiomegaly.    KURT ESPARZA MD   Chest  XR, 1 view PORTABLE    Narrative    CHEST ONE VIEW PORTABLE  8/11/2018 9:21 AM     HISTORY:  Chest tube placement.      COMPARISON: Earlier today.    FINDINGS: Mild elevation of the left hemidiaphragm. Superior  translation of the left humeral head, presumably related to rotator  cuff pathology.      Impression    IMPRESSION:  Placement of a right chest tube. Decreased right  pneumothorax, currently small.    JONG MARRERO MD   XR Chest 2 Views    Narrative    XR CHEST 2 VW 8/12/2018 7:05 AM    HISTORY: Rib fractures.    COMPARISON: August 11, 2018.      Impression    IMPRESSION: Right chest tube in place as before, with bibasilar  atelectasis, left greater than right. The previously noted right  pneumothorax is not well appreciated on today's examination. Stable  mild cardiomegaly.    KURT ESPARZA MD   XR Chest 2 Views    Narrative    XR CHEST 2 VW 8/12/2018 11:23 AM    HISTORY: Pneumothorax.    COMPARISON: August 12, 2018.      Impression    IMPRESSION: Interval removal of right chest tube. There is a small  right apical pneumothorax measuring approximately 1.3 cm to the lung  apex. Streaky atelectasis or scarring of the left lung base persists,  with likely small left effusion.    KURT ESPARZA MD[AO1.1]  "         Revision History        User Key Date/Time User Provider Type Action    > AO1.2 8/14/2018  5:06 PM Alfred Alicea MD Physician Sign     AO1.1 8/14/2018  8:21 AM Alfred Alicea MD Physician             Progress Notes by Debi Medel MD at 8/14/2018 10:52 AM     Author:  Debi Medel MD Service:  Surgery Author Type:  Physician    Filed:  8/14/2018  4:42 PM Date of Service:  8/14/2018 10:52 AM Creation Time:  8/14/2018 10:52 AM    Status:  Addendum :  Debi Medel MD (Physician)         [facility]  General Surgery Progress Note           Assessment and Plan:   Assessment:   Trauma admission  Acute traumatic injuries: non displaced fractures of right posterior 4th and 5th ribs with right pneumothorax  Dementia  Afebrile      Plan:   -Thoracic following, appreciate input. OK to discharge when cleared by thoracic.  -Continue aggressive pulmonary toilet, incentive spirometry  -PT/OT following. Recommend discharge to [facility] with increased assist  -hospitalist following  -SW following  -Pain control: Dilaudid  -Home per Thoracic and IM recommendations, per hospitalist, likely discharge tomorrow.          Interval History:   Comfortable in chair. Denies pain. Denies shortness of breath or difficulty breathing. Has been up walking with PT/OT. Tolerating regular diet.         Physical Exam:   Blood pressure 110/51, pulse 66, temperature 96.4  F (35.8  C), temperature source Oral, resp. rate 18, height 1.626 m (5' 4\"), weight 80.8 kg (178 lb 1.6 oz), SpO2 96 %.    I/O last 3 completed shifts:  In: 490 [P.O.:490]  Out: 1000 [Urine:1000]    General: alert, cooperative, pleasant  HEENT: head normocephalic without ecchymosis  Chest: clear to auscultation, heart RRR, Without crepitus or swelling, no ecchymosis or deformity. Dressing at previous chest tube site in place on right side.  Abdomen: soft, nondistended, non tender, without masses, +BS          " Data:     Chest Xray 8/12/18: IMPRESSION: Interval removal of right chest tube. There is a small  right apical pneumothorax measuring approximately 1.3 cm to the lung  apex. Streaky atelectasis or scarring of the left lung base persists,  with likely small left effusion.      Recent Labs  Lab 08/13/18  0714 08/11/18  0753   WBC 7.6 13.4*   HGB 11.3* 13.5   HCT 35.6 42.4   * 105*    265       Recent Labs  Lab 08/11/18  0753      POTASSIUM 4.1   CHLORIDE 103   CO2 26   ANIONGAP 6   *   BUN 21   CR 0.96   GFRESTIMATED 54*   GFRESTBLACK 65   KEVIN 9.0       Palmira Johnston PA-C[KA1.1]     I agree with the above assessment and plan.  Debi Medel MD[EG1.1]         Revision History        User Key Date/Time User Provider Type Action    > EG1.1 8/14/2018  4:42 PM Debi Medel MD Physician Addend     KA1.1 8/14/2018 11:00 AM Palmira Johnston PA-C Physician Assistant Sign            Progress Notes by Alfred Alicea MD at 8/13/2018  1:36 PM     Author:  Alfred Alicea MD Service:  Hospitalist Author Type:  Physician    Filed:  8/13/2018  1:43 PM Date of Service:  8/13/2018  1:36 PM Creation Time:  8/13/2018  1:36 PM    Status:  Signed :  Alfred Alicea MD (Physician)         [facility]  Hospitalist Progress Note  Alfred Alicea MD 08/13/2018    Reason for Stay (Diagnosis): Mechanical fall, with trauma to chest/rib fracture.         Assessment and Plan:      Summary of Stay: Coty Trotter is a 89 year old female from an assisted living facility, who fell down while going to bed sustained trauma to right chest from 12 4 was in the fifth rib fracture and a moderate pneumothorax on the right side admitted on 8/11/2018 with right fourth and fifth rib fracture with traumatic pneumothorax.    Problem List:   1. Traumatic pneumothorax secondary to right fourth and fifth rib fracture.  -Chest tube management per surgery,  "is removed today.  -Pain control.  -Continue to monitor in the hospital  -Ambulate the patient, incentive spirometry.    2. Mechanical fall resulting in rib fracture.  -Fall precaution, PT and OT evaluation as chest tube is removed.    3. Hypertension.  - Monitor vitals, continue losartan 50 mg daily.    4. Dementia- Patient has significant dementia, she is not oriented to time place person, she is pleasantly confused.  She has no agitation or anxiety   -She has video monitor, at this point no need for a sitter.  -Fall precautions.    # Pain Assessment:  Current Pain Score 8/13/2018   Patient currently in pain? denies   Pain score (0-10) -   Pain location -   Pain descriptors -   Coty ferro pain level was assessed and she currently denies pain.      DVT Prophylaxis: Pneumatic Compression Devices  Code Status: Full Code  Discharge Dispo: Patient lives in an assisted facility, alone, needs support.  Estimated Disch Date / # of Days until Disch: 1 day pending physical therapy, she will likely benefit from TCU as she is still weak and has increased risk of fall.        Interval History (Subjective):      Patient seen and examined, no new overnight issues, no pain, nausea or vomiting.  Patient is still confused, but cooperative and pleasant.                    Physical Exam:      Last Vital Signs:  /57 (BP Location: Right arm)  Pulse 90  Temp 97.5  F (36.4  C) (Oral)  Resp 20  Ht 1.626 m (5' 4\")  Wt 80.8 kg (178 lb 1.6 oz)  SpO2 99%  BMI 30.57 kg/m2    I/O last 3 completed shifts:  In: 1140 [P.O.:1140]  Out: 950 [Urine:950]  Wt Readings from Last 1 Encounters:   08/11/18 80.8 kg (178 lb 1.6 oz)     Current Facility-Administered Medications   Medication     acetaminophen (TYLENOL) tablet 650 mg     alum & mag hydroxide-simethicone (MYLANTA ES/MAALOX  ES) suspension 30 mL     bisacodyl (DULCOLAX) EC tablet 5 mg    Or     bisacodyl (DULCOLAX) EC tablet 10 mg    Or     bisacodyl (DULCOLAX) EC tablet 15 mg     " bisacodyl (DULCOLAX) Suppository 10 mg     HYDROmorphone (PF) (DILAUDID) injection 0.2 mg     labetalol (NORMODYNE/TRANDATE) injection 10 mg     levothyroxine (SYNTHROID/LEVOTHROID) tablet 75 mcg     lidocaine (LMX4) kit     lidocaine 1 % 1 mL     losartan (COZAAR) tablet 50 mg     magnesium sulfate 4 g in 100 mL sterile water (premade)     melatonin tablet 1 mg     naloxone (NARCAN) injection 0.1-0.4 mg     ondansetron (ZOFRAN-ODT) ODT tab 4 mg    Or     ondansetron (ZOFRAN) injection 4 mg     oxyCODONE IR (ROXICODONE) tablet 5-10 mg     polyethylene glycol (MIRALAX/GLYCOLAX) Packet 17 g     polyethylene glycol (MIRALAX/GLYCOLAX) Packet 17 g     potassium chloride (KLOR-CON) Packet 20-40 mEq     potassium chloride 10 mEq in 100 mL intermittent infusion with 10 mg lidocaine     potassium chloride 10 mEq in 100 mL sterile water intermittent infusion (premix)     potassium chloride SA (K-DUR/KLOR-CON M) CR tablet 20-40 mEq     prochlorperazine (COMPAZINE) injection 5 mg    Or     prochlorperazine (COMPAZINE) tablet 5 mg    Or     prochlorperazine (COMPAZINE) Suppository 12.5 mg     senna-docusate (SENOKOT-S;PERICOLACE) 8.6-50 MG per tablet 1 tablet    Or     senna-docusate (SENOKOT-S;PERICOLACE) 8.6-50 MG per tablet 2 tablet     sertraline (ZOLOFT) tablet 100 mg     sodium chloride (PF) 0.9% PF flush 3 mL     sodium chloride (PF) 0.9% PF flush 3 mL     valACYclovir (VALTREX) tablet 1,000 mg       Constitutional: Awake, alert, cooperative, no apparent distress   Respiratory: Clear to auscultation bilaterally, no crackles or wheezing   Cardiovascular: Regular rate and rhythm, normal S1 and S2, and no murmur noted   Abdomen: Normal bowel sounds, soft, non-distended, non-tender   Skin: No rashes, no cyanosis, dry to touch   Neuro: Alert and oriented x3, no weakness, numbness, memory loss   Extremities: No edema, normal range of motion   Other(s):HEENT Pink, un icteric scleral.       All other systems: Negative           Medications:      All current medications were reviewed with changes reflected in problem list.         Data:      All new lab and imaging data was reviewed.   Labs:  No results for input(s): CULT in the last 168 hours.    Recent Labs  Lab 08/11/18  0753      POTASSIUM 4.1   CHLORIDE 103   CO2 26   ANIONGAP 6   *   BUN 21   CR 0.96   GFRESTIMATED 54*   GFRESTBLACK 65   KEVIN 9.0       Recent Labs  Lab 08/13/18  0714 08/11/18  0753   WBC 7.6 13.4*   HGB 11.3* 13.5   HCT 35.6 42.4   * 105*    265       Recent Labs  Lab 08/11/18  0753   *      Imaging:   Results for orders placed or performed during the hospital encounter of 08/11/18   Head CT w/o contrast    Narrative    CT HEAD W/O CONTRAST 8/11/2018 6:32 AM    HISTORY: Fall, evaluate for intracranial hemorrhage.    COMPARISON: 11/25/2015    TECHNIQUE: Noncontrast head CT.  Radiation dose for this scan was  reduced using automated exposure control, adjustment of the mA and/or  kV according to patient size, or iterative reconstruction technique.    FINDINGS: No intracranial hemorrhage. No abnormal extra-axial fluid  collection. Midline is maintained. Ventricular volumes are stable.  Chronic generalized atrophy. Calvarium is intact. Sinuses and mastoid  air cells are normally aerated.      Impression    IMPRESSION: No acute abnormality.    MYAH DAVID MD   Cervical spine CT w/o contrast    Narrative    CT CERVICAL SPINE W/O CONTRAST 8/11/2018 6:40 AM    HISTORY: Fall, evaluate for fracture.    COMPARISON: 11/25/2015    TECHNIQUE: Noncontrast cervical spine CT.  Radiation dose for this  scan was reduced using automated exposure control, adjustment of the  mA and/or kV according to patient size, or iterative reconstruction  technique.    FINDINGS: No acute fracture or malalignment. Chronic degenerative  changes at C5-C6-C7, stable in comparison with 11/25/2015.  Prevertebral soft tissues are normal. No acute soft tissue  abnormality.       Impression    IMPRESSION: No acute abnormality.    MYAH DAVID MD   CT Thoracic Spine w/o Contrast    Narrative    CT THORACIC SPINE W/O CONTRAST 8/11/2018 6:47 AM    HISTORY: Tenderness after fall.    COMPARISON: None.    TECHNIQUE: Noncontrast thoracic spine CT.  Radiation dose for this  scan was reduced using automated exposure control, adjustment of the  mA and/or kV according to patient size, or iterative reconstruction  technique.    FINDINGS: Nondisplaced fractures of the right posterior fourth and  fifth ribs and moderate right pneumothorax are better visualized on  dedicated chest CT. Thoracic vertebral bodies appear intact. No  malalignment. Multilevel loss of disc space associated with marginal  osteophyte formation.       Impression    IMPRESSION:   1. Degenerative change in the thoracic spine without acute thoracic  spine abnormality.  2. Nondisplaced fractures of the right posterior fourth and fifth ribs  with moderate right pneumothorax, better visualized on dedicated chest  CT.    MYAH DAVID MD   CT Chest w/o Contrast    Narrative    CT CHEST W/O CONTRAST 8/11/2018 6:40 AM    HISTORY: Fall, rib pain.    COMPARISON: None.    TECHNIQUE: Noncontrast chest CT.  Radiation dose for this scan was  reduced using automated exposure control, adjustment of the mA and/or  kV according to patient size, or iterative reconstruction technique.    FINDINGS: Nondisplaced fractures of the right posterior fourth and  fifth ribs. Moderate right pneumothorax. Left-sided ribs appear intact  as do the remaining visualized osseous structures.    Moderate cardiomegaly without pericardial effusion. Patchy coronary  vascular calcification. No mediastinal or hilar adenopathy.    No acute abnormality in the visualized upper abdomen.      Impression    IMPRESSION: Nondisplaced fractures of the right posterior fourth and  fifth ribs with associated moderate right pneumothorax.    MYAH DAVID MD   Chest XR,  PA & LAT     Narrative    XR CHEST 2 VW 8/11/2018 7:31 AM    HISTORY: Pneumothorax.    COMPARISON: None.      Impression    IMPRESSION: Mild to moderate right apical pneumothorax. The left lung  appears clear. No pleural effusions appreciated. Mild cardiomegaly.    KURT ESPARZA MD   Chest  XR, 1 view PORTABLE    Narrative    CHEST ONE VIEW PORTABLE  8/11/2018 9:21 AM     HISTORY:  Chest tube placement.      COMPARISON: Earlier today.    FINDINGS: Mild elevation of the left hemidiaphragm. Superior  translation of the left humeral head, presumably related to rotator  cuff pathology.      Impression    IMPRESSION:  Placement of a right chest tube. Decreased right  pneumothorax, currently small.    JONG MARRERO MD   XR Chest 2 Views    Narrative    XR CHEST 2 VW 8/12/2018 7:05 AM    HISTORY: Rib fractures.    COMPARISON: August 11, 2018.      Impression    IMPRESSION: Right chest tube in place as before, with bibasilar  atelectasis, left greater than right. The previously noted right  pneumothorax is not well appreciated on today's examination. Stable  mild cardiomegaly.    KURT ESPARZA MD   XR Chest 2 Views    Narrative    XR CHEST 2 VW 8/12/2018 11:23 AM    HISTORY: Pneumothorax.    COMPARISON: August 12, 2018.      Impression    IMPRESSION: Interval removal of right chest tube. There is a small  right apical pneumothorax measuring approximately 1.3 cm to the lung  apex. Streaky atelectasis or scarring of the left lung base persists,  with likely small left effusion.    KURT ESPARZA MD[AO1.1]          Revision History        User Key Date/Time User Provider Type Action    > AO1.1 8/13/2018  1:43 PM Alfred Alicea MD Physician Sign            Progress Notes by Debi Medel MD at 8/13/2018  9:28 AM     Author:  Debi Medel MD Service:  Surgery Author Type:  Physician    Filed:  8/13/2018 11:14 AM Date of Service:  8/13/2018  9:28 AM Creation Time:  8/13/2018  9:28 AM    Status:  Addendum :   "Debi Medel MD (Physician)         [facility]  General Surgery Progress Note           Assessment and Plan:   Assessment:   Trauma admission  Acute traumatic injuries: non displaced fractures of right posterior 4th and 5th ribs with right pneumothorax  Dementia      Plan:   -Thoracic following, appreciate input  -Continue aggressive pulmonary toilet, incentive spirometry  -PT/OT following   -hospitalist following  -Pain control: Dilaudid  -Home per Thoracic and IM recommendations         Interval History:   Comfortable in chair. Denies pain. Denies shortness of breath or difficulty breathing. Has been up walking with PT/OT. Tolerating regular diet.         Physical Exam:   Blood pressure 161/65, pulse 90, temperature 98.1  F (36.7  C), temperature source Oral, resp. rate 18, height 1.626 m (5' 4\"), weight 80.8 kg (178 lb 1.6 oz), SpO2 95 %.    I/O last 3 completed shifts:  In: 1140 [P.O.:1140]  Out: 950 [Urine:950]    General: alert, cooperative, pleasant  HEENT: head normocephalic without ecchymosis  Chest: clear to auscultation, heart RRR, Without crepitus or swelling, no ecchymosis or deformity. Dressing at previous chest tube site in place on right side.  Abdomen: soft, nondistended, non tender, without masses, +BS          Data:     Chest Xray 8/12/18: IMPRESSION: Interval removal of right chest tube. There is a small  right apical pneumothorax measuring approximately 1.3 cm to the lung  apex. Streaky atelectasis or scarring of the left lung base persists,  with likely small left effusion.      Recent Labs  Lab 08/13/18  0714 08/11/18  0753   WBC 7.6 13.4*   HGB 11.3* 13.5   HCT 35.6 42.4   * 105*    265       Recent Labs  Lab 08/11/18  0753      POTASSIUM 4.1   CHLORIDE 103   CO2 26   ANIONGAP 6   *   BUN 21   CR 0.96   GFRESTIMATED 54*   GFRESTBLACK 65   KEVIN 9.0       Kirk Lorenzo PA-C[ML1.1]     Repeat CXR pending official read.  Agree with above.  " Ok to DC when PTX stable or resolved and thoracic surgery agrees.[EG1.1]    Debi Medel MD[EG1.2]       Revision History        User Key Date/Time User Provider Type Action    > EG1.2 8/13/2018 11:14 AM Debi Medel MD Physician Addend     EG1.1 8/13/2018 11:11 AM Debi Medel MD Physician      ML1.1 8/13/2018  9:33 AM Kirk Lorenzo PA-C Physician Assistant Sign            Progress Notes by Cindy Regan, RN at 8/13/2018  3:59 AM     Author:  Cindy Regan RN Service:  Nursing Author Type:  Registered Nurse    Filed:  8/13/2018  4:00 AM Date of Service:  8/13/2018  3:59 AM Creation Time:  8/13/2018  3:59 AM    Status:  Signed :  Cindy Regan RN (Registered Nurse)         Pt's B/P 216/85, HR 90 at rest, MD paged for PRN B/P medication[AD1.1]     Revision History        User Key Date/Time User Provider Type Action    > AD1.1 8/13/2018  4:00 AM Cindy Regan, RN Registered Nurse Sign            Progress Notes by Alhaji Valera III, MD at 8/13/2018  1:07 AM     Author:  Alhaji Valera III, MD Service:  Hospitalist Author Type:  Physician    Filed:  8/13/2018  1:08 AM Date of Service:  8/13/2018  1:07 AM Creation Time:  8/13/2018  1:07 AM    Status:  Signed :  Alhaji Valera III, MD (Physician)         Cross coverage note     paged by nursing to evaluate new onset of a rash/blisters on the left lower back.    Patient is comfortable at this time though she just received pain medication.  She does have a small vesicular rash on her left lower back overlying an erythematous base.  I believe it is reasonable to prescribe her some Valtrex for possible shingles which she has had before.  Would restrict pregnant visitors/staff[GH1.1]     Revision History        User Key Date/Time User Provider Type Action    > GH1.1 8/13/2018  1:08 AM Alhaji Valera III, MD Physician Sign            Progress Notes by Gianna Rivas, PT at  "8/12/2018  9:39 AM     Author:  Gianna Rivas PT Service:  (none) Author Type:  Physical Therapist    Filed:  8/12/2018  7:55 PM Date of Service:  8/12/2018  9:39 AM Creation Time:  8/12/2018  7:55 PM    Status:  Signed :  Gianna Rivas PT (Physical Therapist)          08/12/18 0830   Quick Adds   Type of Visit Initial PT Evaluation   Living Environment   Lives With alone;facility resident   Living Arrangements assisted living   Home Accessibility no concerns   Transportation Available agency transportation;family or friend will provide   Living Environment Comment Patient lives in [facility] in [facility], daughter reports that patient gets med management, escort to meals/programs, and comfort cares throughout the day or re-directing patient as needed   Self-Care   Dominant Hand right   Usual Activity Tolerance moderate   Current Activity Tolerance moderate   Regular Exercise yes   Activity/Exercise Type (patient attends a daily seated exercise class)   Equipment Currently Used at Home walker, rolling   Activity/Exercise/Self-Care Comment Daughter reports that patient \"does not like her walker and will refuse to use it at times\"   Functional Level Prior   Ambulation 1-->assistive equipment   Transferring 1-->assistive equipment   Toileting 0-->independent   Bathing 0-->independent   Dressing 0-->independent   Eating 0-->independent   Communication 0-->understands/communicates without difficulty   Swallowing 0-->swallows foods/liquids without difficulty   Cognition 1 - attention or memory deficits   Fall history within last six months yes   Number of times patient has fallen within last six months 1   Which of the above functional risks had a recent onset or change? ambulation;transferring;toileting;bathing;dressing   General Information   Onset of Illness/Injury or Date of Surgery - Date 08/11/18   Referring Physician Debi Medel MD   Patient/Family Goals Statement return to [facility] "   Pertinent History of Current Problem (include personal factors and/or comorbidities that impact the POC) Patient with PMH including dementia now admitted following a fall with sustained trauma to right chest with nondisplaced right 4th and 5th rib fractures, and moderate pneumothorax.   Cognitive Status Examination   Orientation person   Personal Safety and Judgment impaired;impulsive   Memory impaired   Cognitive Comment Patient with known cognitive deficits   Pain Assessment   Patient Currently in Pain (no reports of pain)   Integumentary/Edema   Integumentary/Edema Comments bruising on Right trunk due to fall   Posture    Posture Forward head position;Protracted shoulders   Range of Motion (ROM)   ROM Comment WFL   Strength   Strength Comments mild strength deficits, activity tolerance deficits during ambulation   Bed Mobility   Bed Mobility Comments min A   Transfer Skills   Transfer Comments CGA with 2WW and verbal cueing   Gait   Gait Comments Patient amb 100 feet x2 with 2WW and CGA   Balance   Balance Comments CGA with all mobility   General Therapy Interventions   Planned Therapy Interventions balance training;bed mobility training;gait training;strengthening;transfer training;home program guidelines;progressive activity/exercise   Clinical Impression   Criteria for Skilled Therapeutic Intervention yes, treatment indicated   PT Diagnosis decreased independence with moiblity   Influenced by the following impairments pain, decreased activity tolerance   Clinical Presentation Stable/Uncomplicated   Clinical Presentation Rationale mildly complex pmh, stable presentation, good social support   Clinical Decision Making (Complexity) Low complexity   Therapy Frequency` 5 times/week   Predicted Duration of Therapy Intervention (days/wks) 5 days   Anticipated Discharge Disposition Long Term Care Facility  ([facility] with home cares)   Risk & Benefits of therapy have been explained Yes   Patient, Family & other staff in  "agreement with plan of care Yes   [facility] TM \"6 Clicks\"   2016, Trustees of [facility], under license to [employer].  All rights reserved.   6 Clicks Short Forms Basic Mobility Inpatient Short Form   [facility]  \"6 Clicks\" V.2 Basic Mobility Inpatient Short Form   1. Turning from your back to your side while in a flat bed without using bedrails? 3 - A Little   2. Moving from lying on your back to sitting on the side of a flat bed without using bedrails? 3 - A Little   3. Moving to and from a bed to a chair (including a wheelchair)? 3 - A Little   4. Standing up from a chair using your arms (e.g., wheelchair, or bedside chair)? 3 - A Little   5. To walk in hospital room? 3 - A Little   6. Climbing 3-5 steps with a railing? 2 - A Lot   Basic Mobility Raw Score (Score out of 24.Lower scores equate to lower levels of function) 17   Total Evaluation Time   Total Evaluation Time (Minutes) 5[MM1.1]        Revision History        User Key Date/Time User Provider Type Action    > MM1.1 8/12/2018  7:55 PM Gianna Rivas, PT Physical Therapist Sign            Progress Notes by lAfred Alicea MD at 8/12/2018 12:44 PM     Author:  Alfred Alicea MD Service:  Hospitalist Author Type:  Physician    Filed:  8/12/2018 12:56 PM Date of Service:  8/12/2018 12:44 PM Creation Time:  8/12/2018 12:44 PM    Status:  Signed :  Alfred Alicea MD (Physician)         [facility]  Hospitalist Progress Note  Alfred Alicea MD 08/12/2018    Reason for Stay (Diagnosis): Mechanical fall, with trauma to chest/rib fracture.         Assessment and Plan:      Summary of Stay: Coty Trotter is a 89 year old female from an assisted living facility, who fell down while going to bed sustained trauma to right chest from 12 4 was in the fifth rib fracture and a moderate pneumothorax on the right side admitted on 8/11/2018 with right fourth and fifth rib " "fracture with traumatic pneumothorax.    Problem List:   1. Traumatic pneumothorax secondary to right fourth and fifth rib fracture.  -Chest tube management per surgery, is removed today.  -Pain control.  -Continue to monitor in the hospital  -Ambulate the patient, incentive spirometry  2. Mechanical fall.  -Fall precaution, PT and OT evaluation as chest tube is removed.    3. Hypertension-  - Monitor vitals, continue losartan 50 mg daily.    4. Dementia-patient is confused today, trying to put chest tube, and had bedside attendant.  -Continue to closely monitor his patient's as she has risk of developing delirium.  -Fall precautions  -At this point no need for bedside attendant, but needs video monitor and frequent checks.    # Pain Assessment:  Current Pain Score 8/12/2018   Patient currently in pain? denies   Pain score (0-10) -   Pain location -   Pain descriptors -   Coty ferro pain level was assessed and she currently denies pain.      DVT Prophylaxis: Pneumatic Compression Devices  Code Status: Full Code  Discharge Dispo: 2 days if continued to improve.  Estimated Disch Date / # of Days until Disch: 2 days.        Interval History (Subjective):      Patient seen and examined, assumed care today, no new overnight issues, no pain, nausea or vomiting.  Patient was confused earlier, sitter was at bedside as she could easily pull lines and chest tube.                  Physical Exam:      Last Vital Signs:  /47 (BP Location: Right arm)  Pulse 67  Temp 96.7  F (35.9  C) (Oral)  Resp 18  Ht 1.626 m (5' 4\")  Wt 80.8 kg (178 lb 1.6 oz)  SpO2 97%  BMI 30.57 kg/m2    I/O last 3 completed shifts:  In: 650 [P.O.:650]  Out: 1016 [Urine:1000; Chest Tube:16]  Wt Readings from Last 1 Encounters:   08/11/18 80.8 kg (178 lb 1.6 oz)     Current Facility-Administered Medications   Medication     acetaminophen (TYLENOL) tablet 650 mg     alum & mag hydroxide-simethicone (MYLANTA ES/MAALOX  ES) suspension 30 mL     " bisacodyl (DULCOLAX) EC tablet 5 mg    Or     bisacodyl (DULCOLAX) EC tablet 10 mg    Or     bisacodyl (DULCOLAX) EC tablet 15 mg     bisacodyl (DULCOLAX) Suppository 10 mg     HYDROmorphone (PF) (DILAUDID) injection 0.2 mg     ketamine (5 mg/mL)-propofol (5 mg/mL) 1:1 for injection     levothyroxine (SYNTHROID/LEVOTHROID) tablet 75 mcg     lidocaine (LMX4) kit     lidocaine 1 % 1 mL     losartan (COZAAR) tablet 50 mg     magnesium sulfate 4 g in 100 mL sterile water (premade)     melatonin tablet 1 mg     naloxone (NARCAN) injection 0.1-0.4 mg     ondansetron (ZOFRAN-ODT) ODT tab 4 mg    Or     ondansetron (ZOFRAN) injection 4 mg     oxyCODONE IR (ROXICODONE) tablet 5-10 mg     polyethylene glycol (MIRALAX/GLYCOLAX) Packet 17 g     polyethylene glycol (MIRALAX/GLYCOLAX) Packet 17 g     potassium chloride (KLOR-CON) Packet 20-40 mEq     potassium chloride 10 mEq in 100 mL intermittent infusion with 10 mg lidocaine     potassium chloride 10 mEq in 100 mL sterile water intermittent infusion (premix)     potassium chloride SA (K-DUR/KLOR-CON M) CR tablet 20-40 mEq     prochlorperazine (COMPAZINE) injection 5 mg    Or     prochlorperazine (COMPAZINE) tablet 5 mg    Or     prochlorperazine (COMPAZINE) Suppository 12.5 mg     senna-docusate (SENOKOT-S;PERICOLACE) 8.6-50 MG per tablet 1 tablet    Or     senna-docusate (SENOKOT-S;PERICOLACE) 8.6-50 MG per tablet 2 tablet     sertraline (ZOLOFT) tablet 100 mg     sodium chloride (PF) 0.9% PF flush 3 mL     sodium chloride (PF) 0.9% PF flush 3 mL       Constitutional: Awake, alert, cooperative, no apparent distress   Respiratory: Clear to auscultation bilaterally, no crackles or wheezing   Cardiovascular: Regular rate and rhythm, normal S1 and S2, and no murmur noted   Abdomen: Normal bowel sounds, soft, non-distended, non-tender   Skin: No rashes, no cyanosis, dry to touch   Neuro: Alert and oriented x3, no weakness, numbness, memory loss   Extremities: No edema, normal  range of motion   Other(s):HEENT        All other systems: Negative          Medications:      All current medications were reviewed with changes reflected in problem list.         Data:      All new lab and imaging data was reviewed.   Labs:  No results for input(s): CULT in the last 168 hours.    Recent Labs  Lab 08/11/18  0753      POTASSIUM 4.1   CHLORIDE 103   CO2 26   ANIONGAP 6   *   BUN 21   CR 0.96   GFRESTIMATED 54*   GFRESTBLACK 65   KEVIN 9.0       Recent Labs  Lab 08/11/18  0753   WBC 13.4*   HGB 13.5   HCT 42.4   *          Recent Labs  Lab 08/11/18  0753   *      Imaging:   Results for orders placed or performed during the hospital encounter of 08/11/18   Head CT w/o contrast    Narrative    CT HEAD W/O CONTRAST 8/11/2018 6:32 AM    HISTORY: Fall, evaluate for intracranial hemorrhage.    COMPARISON: 11/25/2015    TECHNIQUE: Noncontrast head CT.  Radiation dose for this scan was  reduced using automated exposure control, adjustment of the mA and/or  kV according to patient size, or iterative reconstruction technique.    FINDINGS: No intracranial hemorrhage. No abnormal extra-axial fluid  collection. Midline is maintained. Ventricular volumes are stable.  Chronic generalized atrophy. Calvarium is intact. Sinuses and mastoid  air cells are normally aerated.      Impression    IMPRESSION: No acute abnormality.    MYAH DAVID MD   Cervical spine CT w/o contrast    Narrative    CT CERVICAL SPINE W/O CONTRAST 8/11/2018 6:40 AM    HISTORY: Fall, evaluate for fracture.    COMPARISON: 11/25/2015    TECHNIQUE: Noncontrast cervical spine CT.  Radiation dose for this  scan was reduced using automated exposure control, adjustment of the  mA and/or kV according to patient size, or iterative reconstruction  technique.    FINDINGS: No acute fracture or malalignment. Chronic degenerative  changes at C5-C6-C7, stable in comparison with 11/25/2015.  Prevertebral soft tissues are normal.  No acute soft tissue  abnormality.      Impression    IMPRESSION: No acute abnormality.    MYAH DAVID MD   CT Thoracic Spine w/o Contrast    Narrative    CT THORACIC SPINE W/O CONTRAST 8/11/2018 6:47 AM    HISTORY: Tenderness after fall.    COMPARISON: None.    TECHNIQUE: Noncontrast thoracic spine CT.  Radiation dose for this  scan was reduced using automated exposure control, adjustment of the  mA and/or kV according to patient size, or iterative reconstruction  technique.    FINDINGS: Nondisplaced fractures of the right posterior fourth and  fifth ribs and moderate right pneumothorax are better visualized on  dedicated chest CT. Thoracic vertebral bodies appear intact. No  malalignment. Multilevel loss of disc space associated with marginal  osteophyte formation.       Impression    IMPRESSION:   1. Degenerative change in the thoracic spine without acute thoracic  spine abnormality.  2. Nondisplaced fractures of the right posterior fourth and fifth ribs  with moderate right pneumothorax, better visualized on dedicated chest  CT.    MYAH DAVID MD   CT Chest w/o Contrast    Narrative    CT CHEST W/O CONTRAST 8/11/2018 6:40 AM    HISTORY: Fall, rib pain.    COMPARISON: None.    TECHNIQUE: Noncontrast chest CT.  Radiation dose for this scan was  reduced using automated exposure control, adjustment of the mA and/or  kV according to patient size, or iterative reconstruction technique.    FINDINGS: Nondisplaced fractures of the right posterior fourth and  fifth ribs. Moderate right pneumothorax. Left-sided ribs appear intact  as do the remaining visualized osseous structures.    Moderate cardiomegaly without pericardial effusion. Patchy coronary  vascular calcification. No mediastinal or hilar adenopathy.    No acute abnormality in the visualized upper abdomen.      Impression    IMPRESSION: Nondisplaced fractures of the right posterior fourth and  fifth ribs with associated moderate right pneumothorax.    MYAH  MD FRANKIE   Chest XR,  PA & LAT    Narrative    XR CHEST 2 VW 8/11/2018 7:31 AM    HISTORY: Pneumothorax.    COMPARISON: None.      Impression    IMPRESSION: Mild to moderate right apical pneumothorax. The left lung  appears clear. No pleural effusions appreciated. Mild cardiomegaly.    KURT ESPARZA MD   Chest  XR, 1 view PORTABLE    Narrative    CHEST ONE VIEW PORTABLE  8/11/2018 9:21 AM     HISTORY:  Chest tube placement.      COMPARISON: Earlier today.    FINDINGS: Mild elevation of the left hemidiaphragm. Superior  translation of the left humeral head, presumably related to rotator  cuff pathology.      Impression    IMPRESSION:  Placement of a right chest tube. Decreased right  pneumothorax, currently small.    JONG MARRERO MD   XR Chest 2 Views    Narrative    XR CHEST 2 VW 8/12/2018 7:05 AM    HISTORY: Rib fractures.    COMPARISON: August 11, 2018.      Impression    IMPRESSION: Right chest tube in place as before, with bibasilar  atelectasis, left greater than right. The previously noted right  pneumothorax is not well appreciated on today's examination. Stable  mild cardiomegaly.    KURT ESPARZA MD   XR Chest 2 Views    Narrative    XR CHEST 2 VW 8/12/2018 11:23 AM    HISTORY: Pneumothorax.    COMPARISON: August 12, 2018.      Impression    IMPRESSION: Interval removal of right chest tube. There is a small  right apical pneumothorax measuring approximately 1.3 cm to the lung  apex. Streaky atelectasis or scarring of the left lung base persists,  with likely small left effusion.    KURT ESPARZA MD[AO1.1]          Revision History        User Key Date/Time User Provider Type Action    > AO1.1 8/12/2018 12:56 PM Alfred Alicea MD Physician Sign            Progress Notes by Johnnie Jacome MD at 8/12/2018 10:57 AM     Author:  Johnnie Jacome MD Service:  Thoracic Surgery Author Type:  Physician    Filed:  8/12/2018 10:58 AM Date of Service:  8/12/2018 10:57 AM Creation Time:  8/12/2018 10:57 AM     "Status:  Signed :  Johnnie Jacome MD (Physician)         THORACIC & FOREGUT SURGERY    Seen and examined.  Doing well.  Pain controlled.  No SOB.    Vitals:    08/12/18 0506 08/12/18 0559 08/12/18 0721 08/12/18 0947   BP: 190/64  161/64 125/46   BP Location: Right arm  Right arm Right arm   Pulse:       Resp: 20 18 18    Temp: 96.3  F (35.7  C)  98.1  F (36.7  C)    TempSrc: Oral  Oral    SpO2: 94% 95% 97%    Weight:       Height:            Most recent labs and Imaging Reviewed No pneumothorax on CXR this morning      A/P: 89 year old woman with closed rib fractures (right 4th and 5th) and a traumatic pneumothorax.  There is no air leak.  - Chest tube removed  - Post pull CXR now.  - Pain control  - Incentive spirometry  - Ambulation  - PT    Johnnie Jacome MD[AB1.1]       Revision History        User Key Date/Time User Provider Type Action    > AB1.1 8/12/2018 10:58 AM Johnnie Jacome MD Physician Sign            Progress Notes by Reji Leary OT at 8/12/2018 10:27 AM     Author:  Reji Leary OT Service:  (none) Author Type:  Occupational Therapist    Filed:  8/12/2018 10:27 AM Date of Service:  8/12/2018 10:27 AM Creation Time:  8/12/2018 10:27 AM    Status:  Signed :  Reji Leary OT (Occupational Therapist)          08/12/18 1017   Quick Adds   Type of Visit Initial Occupational Therapy Evaluation   Living Environment   Lives With alone;facility resident   Living Arrangements assisted living   Home Accessibility no concerns   Transportation Available family or friend will provide   Living Environment Comment Per daughter pt is independent in dressing and bathing, gets assist to meals at times, medications managed for her   Self-Care   Dominant Hand right   Usual Activity Tolerance moderate   Current Activity Tolerance fair   Regular Exercise yes   Activity/Exercise Type (pt states she \"does what they ask her to do\", not specific)   Equipment Currently Used at Home walker, rolling "   Activity/Exercise/Self-Care Comment pt may also have a shower chair   Functional Level Prior   Ambulation 1-->assistive equipment   Transferring 1-->assistive equipment   Toileting 0-->independent   Bathing 0-->independent   Dressing 0-->independent   Eating 0-->independent   Communication 0-->understands/communicates without difficulty   Swallowing 0-->swallows foods/liquids without difficulty   Cognition 1 - attention or memory deficits   Fall history within last six months yes   Number of times patient has fallen within last six months 1   Which of the above functional risks had a recent onset or change? ambulation;transferring;toileting;bathing;dressing   General Information   Onset of Illness/Injury or Date of Surgery - Date 08/11/18   Referring Physician Debi Medel   Patient/Family Goals Statement return home   Additional Occupational Profile Info/Pertinent History of Current Problem pt admitted after a fall in which she suffered nondisplaced fractures of the posterior 4th and 5th ribs.  Pt has some memory issues, daughter reports that they are getting worse but the family is trying to keep pt out of memory care.  Has some anxiety and anger at times, daughter reports staff at [facility] stay with her a while at times and she calms down.   Precautions/Limitations fall precautions   General Observations pt sitting up in chair, sitter and video monitoring present   Cognitive Status Examination   Orientation person   Level of Consciousness alert   Able to Follow Commands mild impairment   Personal Safety (Cognitive) moderate impairment   Memory impaired   Attention No deficits were identified   Visual Perception   Visual Perception Wears glasses   Pain Assessment   Patient Currently in Pain No   Range of Motion (ROM)   ROM Quick Adds No deficits were identified   ROM Comment B UEs   Strength   Manual Muscle Testing Quick Adds Other   Strength Comments general weakness and deconditioning, left side appears slightly  "stronger   Hand Strength   Hand Strength Comments gross grasp fair and equal   Coordination   Upper Extremity Coordination No deficits were identified   Activities of Daily Living Analysis   Impairments Contributing to Impaired Activities of Daily Living balance impaired;cognition impaired;pain;strength decreased   General Therapy Interventions   Planned Therapy Interventions ADL retraining;cognition;strengthening;transfer training   Clinical Impression   Criteria for Skilled Therapeutic Interventions Met yes, treatment indicated   OT Diagnosis decreased safety and independence for ADLS   Influenced by the following impairments confusion, decreased strength and ROM   Assessment of Occupational Performance 3-5 Performance Deficits   Identified Performance Deficits decreaased independence in dressing, grooming, bathroom transfers   Clinical Decision Making (Complexity) Low complexity   Therapy Frequency daily   Predicted Duration of Therapy Intervention (days/wks) 4 days   Anticipated Discharge Disposition Home with Home Therapy;Transitional Care Facility   Risks and Benefits of Treatment have been explained. Yes   Patient, Family & other staff in agreement with plan of care Yes   [facility] TM \"6 Clicks\"   2016, Trustees of [facility], under license to [employer].  All rights reserved.   6 Clicks Short Forms Daily Activity Inpatient Short Form   [facility]  \"6 Clicks\" Daily Activity Inpatient Short Form   1. Putting on and taking off regular lower body clothing? 3 - A Little   2. Bathing (including washing, rinsing, drying)? 3 - A Little   3. Toileting, which includes using toilet, bedpan or urinal? 3 - A Little   4. Putting on and taking off regular upper body clothing? 3 - A Little   5. Taking care of personal grooming such as brushing teeth? 3 - A Little   6. Eating meals? 4 - None   Daily Activity Raw Score (Score out of 24.Lower scores equate to lower levels of function) 19 " "  Total Evaluation Time   Total Evaluation Time (Minutes) 15[JF1.1]        Revision History        User Key Date/Time User Provider Type Action    > JF1.1 8/12/2018 10:27 AM Reji Leary OT Occupational Therapist Sign            Progress Notes by Debi Medel MD at 8/12/2018  9:25 AM     Author:  Debi Medel MD Service:  Surgery Author Type:  Physician    Filed:  8/12/2018  9:54 AM Date of Service:  8/12/2018  9:25 AM Creation Time:  8/12/2018  9:25 AM    Status:  Addendum :  Debi Medel MD (Physician)         [facility]  General Surgery Progress Note           Assessment and Plan:   Assessment:   Trauma admission  Acute traumatic injuries: non displaced fractures of right posterior 4th and 5th ribs with right pneumothorax  Dementia  Afebrile      Plan:   -Chest tube management per thoracic, possible removal today per notes  -Continue aggressive pulmonary toilet, incentive spirometry  -PT/OT following   -hospitalist following  -Pain control: Dilaudid         Interval History:[KA1.1]   Comfortable up seated in chair. Confused. Reports right sided chest pain, controlled. Denies shortness of breath or difficulty breathing.[KA1.2]          Physical Exam:   Blood pressure 161/64, pulse 67, temperature 98.1  F (36.7  C), temperature source Oral, resp. rate 18, height 1.626 m (5' 4\"), weight 80.8 kg (178 lb 1.6 oz), SpO2 97 %.    I/O last 3 completed shifts:  In: 650 [P.O.:650]  Out: 1016 [Urine:1000; Chest Tube:16]    General: alert, cooperative, confused, pleasant  HEENT: PERRL, head normocephalic without ecchymosis  Chest: clear to auscultation, heart RRR, no murmurs. Without crepitus or swelling, no ecchymosis or deformity   Abdomen: soft, nondistended, non tender, without masses, +BS  Neuro: grossly intact  Skin: without abrasions or ecchymosis          Data:[KA1.1]       Recent Labs  Lab 08/11/18  0753   WBC 13.4*   HGB 13.5   HCT 42.4   *    "       Recent Labs  Lab 08/11/18  0753      POTASSIUM 4.1   CHLORIDE 103   CO2 26   ANIONGAP 6   *   BUN 21   CR 0.96   GFRESTIMATED 54*   GFRESTBLACK 65   KEVIN 9.0[KA1.3]       Palmira Johnston PA-C[KA1.1]     I agree with the above assessment and plan.  Debi Medel MD[EG1.1]         Revision History        User Key Date/Time User Provider Type Action    > EG1.1 8/12/2018  9:54 AM Debi Medel MD Physician Addend     KA1.2 8/12/2018  9:37 AM Palmira Johnston PA-C Physician Assistant Sign     KA1.3 8/12/2018  9:31 AM Palmira Johnston PA-C Physician Assistant      KA1.1 8/12/2018  9:25 AM Palmira Johnston PA-C Physician Assistant                   Procedure Notes     No notes of this type exist for this encounter.         Progress Notes - Therapies (Notes from 08/12/18 through 08/15/18)      Progress Notes by Gianna Rivas PT at 8/12/2018  9:39 AM     Author:  Gianna Rivas PT Service:  (none) Author Type:  Physical Therapist    Filed:  8/12/2018  7:55 PM Date of Service:  8/12/2018  9:39 AM Creation Time:  8/12/2018  7:55 PM    Status:  Signed :  Gianna Rivas PT (Physical Therapist)          08/12/18 0830   Quick Adds   Type of Visit Initial PT Evaluation   Living Environment   Lives With alone;facility resident   Living Arrangements assisted living   Home Accessibility no concerns   Transportation Available agency transportation;family or friend will provide   Living Environment Comment Patient lives in [facility] [place], daughter reports that patient gets med management, escort to meals/programs, and comfort cares throughout the day or re-directing patient as needed   Self-Care   Dominant Hand right   Usual Activity Tolerance moderate   Current Activity Tolerance moderate   Regular Exercise yes   Activity/Exercise Type (patient attends a daily seated exercise class)   Equipment Currently Used at Home walker, rolling  "  Activity/Exercise/Self-Care Comment Daughter reports that patient \"does not like her walker and will refuse to use it at times\"   Functional Level Prior   Ambulation 1-->assistive equipment   Transferring 1-->assistive equipment   Toileting 0-->independent   Bathing 0-->independent   Dressing 0-->independent   Eating 0-->independent   Communication 0-->understands/communicates without difficulty   Swallowing 0-->swallows foods/liquids without difficulty   Cognition 1 - attention or memory deficits   Fall history within last six months yes   Number of times patient has fallen within last six months 1   Which of the above functional risks had a recent onset or change? ambulation;transferring;toileting;bathing;dressing   General Information   Onset of Illness/Injury or Date of Surgery - Date 08/11/18   Referring Physician Debi Medel MD   Patient/Family Goals Statement return to nursing home   Pertinent History of Current Problem (include personal factors and/or comorbidities that impact the POC) Patient with PMH including dementia now admitted following a fall with sustained trauma to right chest with nondisplaced right 4th and 5th rib fractures, and moderate pneumothorax.   Cognitive Status Examination   Orientation person   Personal Safety and Judgment impaired;impulsive   Memory impaired   Cognitive Comment Patient with known cognitive deficits   Pain Assessment   Patient Currently in Pain (no reports of pain)   Integumentary/Edema   Integumentary/Edema Comments bruising on Right trunk due to fall   Posture    Posture Forward head position;Protracted shoulders   Range of Motion (ROM)   ROM Comment WFL   Strength   Strength Comments mild strength deficits, activity tolerance deficits during ambulation   Bed Mobility   Bed Mobility Comments min A   Transfer Skills   Transfer Comments CGA with 2WW and verbal cueing   Gait   Gait Comments Patient amb 100 feet x2 with 2WW and CGA   Balance   Balance Comments CGA " "with all mobility   General Therapy Interventions   Planned Therapy Interventions balance training;bed mobility training;gait training;strengthening;transfer training;home program guidelines;progressive activity/exercise   Clinical Impression   Criteria for Skilled Therapeutic Intervention yes, treatment indicated   PT Diagnosis decreased independence with moiblity   Influenced by the following impairments pain, decreased activity tolerance   Clinical Presentation Stable/Uncomplicated   Clinical Presentation Rationale mildly complex pmh, stable presentation, good social support   Clinical Decision Making (Complexity) Low complexity   Therapy Frequency` 5 times/week   Predicted Duration of Therapy Intervention (days/wks) 5 days   Anticipated Discharge Disposition Long Term Care Facility  ([facility] with home cares)   Risk & Benefits of therapy have been explained Yes   Patient, Family & other staff in agreement with plan of care Yes   [facility] [employer] TM \"6 Clicks\"   2016, Trustees of [facility], under license to [employer].  All rights reserved.   6 Clicks Short Forms Basic Mobility Inpatient Short Form   [facility]  \"6 Clicks\" V.2 Basic Mobility Inpatient Short Form   1. Turning from your back to your side while in a flat bed without using bedrails? 3 - A Little   2. Moving from lying on your back to sitting on the side of a flat bed without using bedrails? 3 - A Little   3. Moving to and from a bed to a chair (including a wheelchair)? 3 - A Little   4. Standing up from a chair using your arms (e.g., wheelchair, or bedside chair)? 3 - A Little   5. To walk in hospital room? 3 - A Little   6. Climbing 3-5 steps with a railing? 2 - A Lot   Basic Mobility Raw Score (Score out of 24.Lower scores equate to lower levels of function) 17   Total Evaluation Time   Total Evaluation Time (Minutes) 5[MM1.1]        Revision History        User Key Date/Time User Provider Type Action    > MM1.1 8/12/2018 " " 7:55 PM Gianna Rivas, PT Physical Therapist Sign            Progress Notes by Reji Leary OT at 8/12/2018 10:27 AM     Author:  Reji Leary OT Service:  (none) Author Type:  Occupational Therapist    Filed:  8/12/2018 10:27 AM Date of Service:  8/12/2018 10:27 AM Creation Time:  8/12/2018 10:27 AM    Status:  Signed :  Reji Leary OT (Occupational Therapist)          08/12/18 1017   Quick Adds   Type of Visit Initial Occupational Therapy Evaluation   Living Environment   Lives With alone;facility resident   Living Arrangements assisted living   Home Accessibility no concerns   Transportation Available family or friend will provide   Living Environment Comment Per daughter pt is independent in dressing and bathing, gets assist to meals at times, medications managed for her   Self-Care   Dominant Hand right   Usual Activity Tolerance moderate   Current Activity Tolerance fair   Regular Exercise yes   Activity/Exercise Type (pt states she \"does what they ask her to do\", not specific)   Equipment Currently Used at Home walker, rolling   Activity/Exercise/Self-Care Comment pt may also have a shower chair   Functional Level Prior   Ambulation 1-->assistive equipment   Transferring 1-->assistive equipment   Toileting 0-->independent   Bathing 0-->independent   Dressing 0-->independent   Eating 0-->independent   Communication 0-->understands/communicates without difficulty   Swallowing 0-->swallows foods/liquids without difficulty   Cognition 1 - attention or memory deficits   Fall history within last six months yes   Number of times patient has fallen within last six months 1   Which of the above functional risks had a recent onset or change? ambulation;transferring;toileting;bathing;dressing   General Information   Onset of Illness/Injury or Date of Surgery - Date 08/11/18   Referring Physician Debi Medel   Patient/Family Goals Statement return home   Additional Occupational Profile " Info/Pertinent History of Current Problem pt admitted after a fall in which she suffered nondisplaced fractures of the posterior 4th and 5th ribs.  Pt has some memory issues, daughter reports that they are getting worse but the family is trying to keep pt out of memory care.  Has some anxiety and anger at times, daughter reports staff at [facility] stay with her a while at times and she calms down.   Precautions/Limitations fall precautions   General Observations pt sitting up in chair, sitter and video monitoring present   Cognitive Status Examination   Orientation person   Level of Consciousness alert   Able to Follow Commands mild impairment   Personal Safety (Cognitive) moderate impairment   Memory impaired   Attention No deficits were identified   Visual Perception   Visual Perception Wears glasses   Pain Assessment   Patient Currently in Pain No   Range of Motion (ROM)   ROM Quick Adds No deficits were identified   ROM Comment B UEs   Strength   Manual Muscle Testing Quick Adds Other   Strength Comments general weakness and deconditioning, left side appears slightly stronger   Hand Strength   Hand Strength Comments gross grasp fair and equal   Coordination   Upper Extremity Coordination No deficits were identified   Activities of Daily Living Analysis   Impairments Contributing to Impaired Activities of Daily Living balance impaired;cognition impaired;pain;strength decreased   General Therapy Interventions   Planned Therapy Interventions ADL retraining;cognition;strengthening;transfer training   Clinical Impression   Criteria for Skilled Therapeutic Interventions Met yes, treatment indicated   OT Diagnosis decreased safety and independence for ADLS   Influenced by the following impairments confusion, decreased strength and ROM   Assessment of Occupational Performance 3-5 Performance Deficits   Identified Performance Deficits decreaased independence in dressing, grooming, bathroom transfers   Clinical Decision Making  "(Complexity) Low complexity   Therapy Frequency daily   Predicted Duration of Therapy Intervention (days/wks) 4 days   Anticipated Discharge Disposition Home with Home Therapy;Transitional Care Facility   Risks and Benefits of Treatment have been explained. Yes   Patient, Family & other staff in agreement with plan of care Yes   [facility] TM \"6 Clicks\"   2016, Trustees of [facility], under license to [employer].  All rights reserved.   6 Clicks Short Forms Daily Activity Inpatient Short Form   [facility]  \"6 Clicks\" Daily Activity Inpatient Short Form   1. Putting on and taking off regular lower body clothing? 3 - A Little   2. Bathing (including washing, rinsing, drying)? 3 - A Little   3. Toileting, which includes using toilet, bedpan or urinal? 3 - A Little   4. Putting on and taking off regular upper body clothing? 3 - A Little   5. Taking care of personal grooming such as brushing teeth? 3 - A Little   6. Eating meals? 4 - None   Daily Activity Raw Score (Score out of 24.Lower scores equate to lower levels of function) 19   Total Evaluation Time   Total Evaluation Time (Minutes) 15[JF1.1]        Revision History        User Key Date/Time User Provider Type Action    > JF1.1 8/12/2018 10:27 AM Reji Leary OT Occupational Therapist Sign            "

## 2018-08-12 ENCOUNTER — APPOINTMENT (OUTPATIENT)
Dept: PHYSICAL THERAPY | Facility: CLINIC | Age: 83
DRG: 200 | End: 2018-08-12
Attending: SURGERY
Payer: MEDICARE

## 2018-08-12 ENCOUNTER — APPOINTMENT (OUTPATIENT)
Dept: GENERAL RADIOLOGY | Facility: CLINIC | Age: 83
DRG: 200 | End: 2018-08-12
Attending: THORACIC SURGERY (CARDIOTHORACIC VASCULAR SURGERY)
Payer: MEDICARE

## 2018-08-12 ENCOUNTER — APPOINTMENT (OUTPATIENT)
Dept: OCCUPATIONAL THERAPY | Facility: CLINIC | Age: 83
DRG: 200 | End: 2018-08-12
Attending: SURGERY
Payer: MEDICARE

## 2018-08-12 PROCEDURE — 40000915 ZZH STATISTIC SITTER, EVENING HOURS

## 2018-08-12 PROCEDURE — 99231 SBSQ HOSP IP/OBS SF/LOW 25: CPT | Performed by: PHYSICIAN ASSISTANT

## 2018-08-12 PROCEDURE — 40000193 ZZH STATISTIC PT WARD VISIT

## 2018-08-12 PROCEDURE — 97535 SELF CARE MNGMENT TRAINING: CPT | Mod: GO | Performed by: OCCUPATIONAL THERAPIST

## 2018-08-12 PROCEDURE — 97165 OT EVAL LOW COMPLEX 30 MIN: CPT | Mod: GO | Performed by: OCCUPATIONAL THERAPIST

## 2018-08-12 PROCEDURE — 12000000 ZZH R&B MED SURG/OB

## 2018-08-12 PROCEDURE — 71046 X-RAY EXAM CHEST 2 VIEWS: CPT | Mod: 76

## 2018-08-12 PROCEDURE — 25000132 ZZH RX MED GY IP 250 OP 250 PS 637: Mod: GY | Performed by: INTERNAL MEDICINE

## 2018-08-12 PROCEDURE — 25000128 H RX IP 250 OP 636: Performed by: SURGERY

## 2018-08-12 PROCEDURE — 97530 THERAPEUTIC ACTIVITIES: CPT | Mod: GP

## 2018-08-12 PROCEDURE — 99232 SBSQ HOSP IP/OBS MODERATE 35: CPT | Performed by: INTERNAL MEDICINE

## 2018-08-12 PROCEDURE — 99207 ZZC CDG-MDM COMPONENT: MEETS LOW - DOWN CODED: CPT | Performed by: INTERNAL MEDICINE

## 2018-08-12 PROCEDURE — 71046 X-RAY EXAM CHEST 2 VIEWS: CPT

## 2018-08-12 PROCEDURE — 40000914 ZZH STATISTIC SITTER, DAY HOURS

## 2018-08-12 PROCEDURE — 40000916 ZZH STATISTIC SITTER, NIGHT HOURS

## 2018-08-12 PROCEDURE — A9270 NON-COVERED ITEM OR SERVICE: HCPCS | Mod: GY | Performed by: INTERNAL MEDICINE

## 2018-08-12 PROCEDURE — 25000132 ZZH RX MED GY IP 250 OP 250 PS 637: Mod: GY | Performed by: SURGERY

## 2018-08-12 PROCEDURE — 40000133 ZZH STATISTIC OT WARD VISIT: Performed by: OCCUPATIONAL THERAPIST

## 2018-08-12 PROCEDURE — A9270 NON-COVERED ITEM OR SERVICE: HCPCS | Mod: GY | Performed by: SURGERY

## 2018-08-12 PROCEDURE — 97161 PT EVAL LOW COMPLEX 20 MIN: CPT | Mod: GP

## 2018-08-12 PROCEDURE — 97116 GAIT TRAINING THERAPY: CPT | Mod: GP

## 2018-08-12 RX ADMIN — POLYETHYLENE GLYCOL 3350 17 G: 17 POWDER, FOR SOLUTION ORAL at 08:42

## 2018-08-12 RX ADMIN — LOSARTAN POTASSIUM 50 MG: 50 TABLET ORAL at 08:42

## 2018-08-12 RX ADMIN — SENNOSIDES AND DOCUSATE SODIUM 2 TABLET: 8.6; 5 TABLET ORAL at 20:45

## 2018-08-12 RX ADMIN — SENNOSIDES AND DOCUSATE SODIUM 1 TABLET: 8.6; 5 TABLET ORAL at 08:42

## 2018-08-12 RX ADMIN — LEVOTHYROXINE SODIUM 75 MCG: 75 TABLET ORAL at 08:42

## 2018-08-12 RX ADMIN — Medication 0.2 MG: at 06:01

## 2018-08-12 RX ADMIN — Medication 0.2 MG: at 02:18

## 2018-08-12 RX ADMIN — SERTRALINE HYDROCHLORIDE 100 MG: 100 TABLET ORAL at 08:42

## 2018-08-12 ASSESSMENT — ACTIVITIES OF DAILY LIVING (ADL)
ADLS_ACUITY_SCORE: 20
ADLS_ACUITY_SCORE: 20
ADLS_ACUITY_SCORE: 15
ADLS_ACUITY_SCORE: 11.5
ADLS_ACUITY_SCORE: 10.5
ADLS_ACUITY_SCORE: 20

## 2018-08-12 NOTE — PLAN OF CARE
Problem: Patient Care Overview  Goal: Plan of Care/Patient Progress Review  Outcome: No Change  Ambulatory Status:  Pt up Ax2 with Brittany Steady.  VS:  VSS   Pain:  C/O pain, tylenol given x2  Resp: LS Dim - 2L O2 NC  GI:  denies nausea.  Fair appetite and on Reg diet.  BS hypo.  Passing flatus.  Last BM Unknown.  :  Purewick in place  Tx:  Chest Tube to water seal  Consults:  PT OT SW Thoracic surgery  Disposition:  TBD    Will continue to monitor and provide supportive cares.

## 2018-08-12 NOTE — PROVIDER NOTIFICATION
Paged Dr. Jacome: Repeat chest xray results are in EPIC. There is a small right apical pneumothorax measuring approximately 1.3 cm to the lung apex.     Update: Dr. Jacome returned page: Will continue to monitor pt for any signs of distress.

## 2018-08-12 NOTE — PROGRESS NOTES
THORACIC & FOREGUT SURGERY    Seen and examined.  Doing well.  Pain controlled.  No SOB.    Vitals:    08/12/18 0506 08/12/18 0559 08/12/18 0721 08/12/18 0947   BP: 190/64  161/64 125/46   BP Location: Right arm  Right arm Right arm   Pulse:       Resp: 20 18 18    Temp: 96.3  F (35.7  C)  98.1  F (36.7  C)    TempSrc: Oral  Oral    SpO2: 94% 95% 97%    Weight:       Height:            Most recent labs and Imaging Reviewed No pneumothorax on CXR this morning      A/P: 89 year old woman with closed rib fractures (right 4th and 5th) and a traumatic pneumothorax.  There is no air leak.  - Chest tube removed  - Post pull CXR now.  - Pain control  - Incentive spirometry  - Ambulation  - PT    Johnnie Jacome MD

## 2018-08-12 NOTE — PROGRESS NOTES
[facility]  Hospitalist Progress Note  Alfred Alicea MD 08/12/2018    Reason for Stay (Diagnosis): Mechanical fall, with trauma to chest/rib fracture.         Assessment and Plan:      Summary of Stay: Coty Trotter is a 89 year old female from an assisted living facility, who fell down while going to bed sustained trauma to right chest from 12 4 was in the fifth rib fracture and a moderate pneumothorax on the right side admitted on 8/11/2018 with right fourth and fifth rib fracture with traumatic pneumothorax.    Problem List:   1. Traumatic pneumothorax secondary to right fourth and fifth rib fracture.  -Chest tube management per surgery, is removed today.  -Pain control.  -Continue to monitor in the hospital  -Ambulate the patient, incentive spirometry  2. Mechanical fall.  -Fall precaution, PT and OT evaluation as chest tube is removed.    3. Hypertension-  - Monitor vitals, continue losartan 50 mg daily.    4. Dementia-patient is confused today, trying to put chest tube, and had bedside attendant.  -Continue to closely monitor his patient's as she has risk of developing delirium.  -Fall precautions  -At this point no need for bedside attendant, but needs video monitor and frequent checks.    # Pain Assessment:  Current Pain Score 8/12/2018   Patient currently in pain? denies   Pain score (0-10) -   Pain location -   Pain descriptors -   Coty ferro pain level was assessed and she currently denies pain.      DVT Prophylaxis: Pneumatic Compression Devices  Code Status: Full Code  Discharge Dispo: 2 days if continued to improve.  Estimated Disch Date / # of Days until Disch: 2 days.        Interval History (Subjective):      Patient seen and examined, assumed care today, no new overnight issues, no pain, nausea or vomiting.  Patient was confused earlier, sitter was at bedside as she could easily pull lines and chest tube.                  Physical Exam:      Last Vital Signs:  /47  "(BP Location: Right arm)  Pulse 67  Temp 96.7  F (35.9  C) (Oral)  Resp 18  Ht 1.626 m (5' 4\")  Wt 80.8 kg (178 lb 1.6 oz)  SpO2 97%  BMI 30.57 kg/m2    I/O last 3 completed shifts:  In: 650 [P.O.:650]  Out: 1016 [Urine:1000; Chest Tube:16]  Wt Readings from Last 1 Encounters:   08/11/18 80.8 kg (178 lb 1.6 oz)     Current Facility-Administered Medications   Medication     acetaminophen (TYLENOL) tablet 650 mg     alum & mag hydroxide-simethicone (MYLANTA ES/MAALOX  ES) suspension 30 mL     bisacodyl (DULCOLAX) EC tablet 5 mg    Or     bisacodyl (DULCOLAX) EC tablet 10 mg    Or     bisacodyl (DULCOLAX) EC tablet 15 mg     bisacodyl (DULCOLAX) Suppository 10 mg     HYDROmorphone (PF) (DILAUDID) injection 0.2 mg     ketamine (5 mg/mL)-propofol (5 mg/mL) 1:1 for injection     levothyroxine (SYNTHROID/LEVOTHROID) tablet 75 mcg     lidocaine (LMX4) kit     lidocaine 1 % 1 mL     losartan (COZAAR) tablet 50 mg     magnesium sulfate 4 g in 100 mL sterile water (premade)     melatonin tablet 1 mg     naloxone (NARCAN) injection 0.1-0.4 mg     ondansetron (ZOFRAN-ODT) ODT tab 4 mg    Or     ondansetron (ZOFRAN) injection 4 mg     oxyCODONE IR (ROXICODONE) tablet 5-10 mg     polyethylene glycol (MIRALAX/GLYCOLAX) Packet 17 g     polyethylene glycol (MIRALAX/GLYCOLAX) Packet 17 g     potassium chloride (KLOR-CON) Packet 20-40 mEq     potassium chloride 10 mEq in 100 mL intermittent infusion with 10 mg lidocaine     potassium chloride 10 mEq in 100 mL sterile water intermittent infusion (premix)     potassium chloride SA (K-DUR/KLOR-CON M) CR tablet 20-40 mEq     prochlorperazine (COMPAZINE) injection 5 mg    Or     prochlorperazine (COMPAZINE) tablet 5 mg    Or     prochlorperazine (COMPAZINE) Suppository 12.5 mg     senna-docusate (SENOKOT-S;PERICOLACE) 8.6-50 MG per tablet 1 tablet    Or     senna-docusate (SENOKOT-S;PERICOLACE) 8.6-50 MG per tablet 2 tablet     sertraline (ZOLOFT) tablet 100 mg     sodium chloride " (PF) 0.9% PF flush 3 mL     sodium chloride (PF) 0.9% PF flush 3 mL       Constitutional: Awake, alert, cooperative, no apparent distress   Respiratory: Clear to auscultation bilaterally, no crackles or wheezing   Cardiovascular: Regular rate and rhythm, normal S1 and S2, and no murmur noted   Abdomen: Normal bowel sounds, soft, non-distended, non-tender   Skin: No rashes, no cyanosis, dry to touch   Neuro: Alert and oriented x3, no weakness, numbness, memory loss   Extremities: No edema, normal range of motion   Other(s):HEENT        All other systems: Negative          Medications:      All current medications were reviewed with changes reflected in problem list.         Data:      All new lab and imaging data was reviewed.   Labs:  No results for input(s): CULT in the last 168 hours.    Recent Labs  Lab 08/11/18  0753      POTASSIUM 4.1   CHLORIDE 103   CO2 26   ANIONGAP 6   *   BUN 21   CR 0.96   GFRESTIMATED 54*   GFRESTBLACK 65   KEVIN 9.0       Recent Labs  Lab 08/11/18  0753   WBC 13.4*   HGB 13.5   HCT 42.4   *          Recent Labs  Lab 08/11/18  0753   *      Imaging:   Results for orders placed or performed during the hospital encounter of 08/11/18   Head CT w/o contrast    Narrative    CT HEAD W/O CONTRAST 8/11/2018 6:32 AM    HISTORY: Fall, evaluate for intracranial hemorrhage.    COMPARISON: 11/25/2015    TECHNIQUE: Noncontrast head CT.  Radiation dose for this scan was  reduced using automated exposure control, adjustment of the mA and/or  kV according to patient size, or iterative reconstruction technique.    FINDINGS: No intracranial hemorrhage. No abnormal extra-axial fluid  collection. Midline is maintained. Ventricular volumes are stable.  Chronic generalized atrophy. Calvarium is intact. Sinuses and mastoid  air cells are normally aerated.      Impression    IMPRESSION: No acute abnormality.    MYAH DAVID MD   Cervical spine CT w/o contrast    Narrative    CT  CERVICAL SPINE W/O CONTRAST 8/11/2018 6:40 AM    HISTORY: Fall, evaluate for fracture.    COMPARISON: 11/25/2015    TECHNIQUE: Noncontrast cervical spine CT.  Radiation dose for this  scan was reduced using automated exposure control, adjustment of the  mA and/or kV according to patient size, or iterative reconstruction  technique.    FINDINGS: No acute fracture or malalignment. Chronic degenerative  changes at C5-C6-C7, stable in comparison with 11/25/2015.  Prevertebral soft tissues are normal. No acute soft tissue  abnormality.      Impression    IMPRESSION: No acute abnormality.    MYAH DAVID MD   CT Thoracic Spine w/o Contrast    Narrative    CT THORACIC SPINE W/O CONTRAST 8/11/2018 6:47 AM    HISTORY: Tenderness after fall.    COMPARISON: None.    TECHNIQUE: Noncontrast thoracic spine CT.  Radiation dose for this  scan was reduced using automated exposure control, adjustment of the  mA and/or kV according to patient size, or iterative reconstruction  technique.    FINDINGS: Nondisplaced fractures of the right posterior fourth and  fifth ribs and moderate right pneumothorax are better visualized on  dedicated chest CT. Thoracic vertebral bodies appear intact. No  malalignment. Multilevel loss of disc space associated with marginal  osteophyte formation.       Impression    IMPRESSION:   1. Degenerative change in the thoracic spine without acute thoracic  spine abnormality.  2. Nondisplaced fractures of the right posterior fourth and fifth ribs  with moderate right pneumothorax, better visualized on dedicated chest  CT.    MYAH DAVID MD   CT Chest w/o Contrast    Narrative    CT CHEST W/O CONTRAST 8/11/2018 6:40 AM    HISTORY: Fall, rib pain.    COMPARISON: None.    TECHNIQUE: Noncontrast chest CT.  Radiation dose for this scan was  reduced using automated exposure control, adjustment of the mA and/or  kV according to patient size, or iterative reconstruction technique.    FINDINGS: Nondisplaced fractures  of the right posterior fourth and  fifth ribs. Moderate right pneumothorax. Left-sided ribs appear intact  as do the remaining visualized osseous structures.    Moderate cardiomegaly without pericardial effusion. Patchy coronary  vascular calcification. No mediastinal or hilar adenopathy.    No acute abnormality in the visualized upper abdomen.      Impression    IMPRESSION: Nondisplaced fractures of the right posterior fourth and  fifth ribs with associated moderate right pneumothorax.    MYAH DAVID MD   Chest XR,  PA & LAT    Narrative    XR CHEST 2 VW 8/11/2018 7:31 AM    HISTORY: Pneumothorax.    COMPARISON: None.      Impression    IMPRESSION: Mild to moderate right apical pneumothorax. The left lung  appears clear. No pleural effusions appreciated. Mild cardiomegaly.    KURT ESPARZA MD   Chest  XR, 1 view PORTABLE    Narrative    CHEST ONE VIEW PORTABLE  8/11/2018 9:21 AM     HISTORY:  Chest tube placement.      COMPARISON: Earlier today.    FINDINGS: Mild elevation of the left hemidiaphragm. Superior  translation of the left humeral head, presumably related to rotator  cuff pathology.      Impression    IMPRESSION:  Placement of a right chest tube. Decreased right  pneumothorax, currently small.    JONG MARRERO MD   XR Chest 2 Views    Narrative    XR CHEST 2 VW 8/12/2018 7:05 AM    HISTORY: Rib fractures.    COMPARISON: August 11, 2018.      Impression    IMPRESSION: Right chest tube in place as before, with bibasilar  atelectasis, left greater than right. The previously noted right  pneumothorax is not well appreciated on today's examination. Stable  mild cardiomegaly.    KURT ESPARZA MD   XR Chest 2 Views    Narrative    XR CHEST 2 VW 8/12/2018 11:23 AM    HISTORY: Pneumothorax.    COMPARISON: August 12, 2018.      Impression    IMPRESSION: Interval removal of right chest tube. There is a small  right apical pneumothorax measuring approximately 1.3 cm to the lung  apex. Streaky atelectasis or scarring  of the left lung base persists,  with likely small left effusion.    KURT ESPARZA MD

## 2018-08-12 NOTE — PLAN OF CARE
Problem: Patient Care Overview  Goal: Plan of Care/Patient Progress Review  Ambulatory Status:  Pt up  1-2 assist with walker and gait belt. Up in chair most of shift. Chair alarm and VPM in place for safety.  Orientation: alert to self only   VS:  Bp elevated. On 2L O2   Pain:  denies  Resp: LS dim.  Chest tube removed this shift. Dressing CDI   GI:  Jayjay regular diet. +BS.  Passing flatus.  Last BM unknown.  :  Voiding without difficulty   Consults:  PT, OT, SW and Thoracic surgery   Disposition:  tbd   '

## 2018-08-12 NOTE — PROGRESS NOTES
"[facility]  General Surgery Progress Note           Assessment and Plan:   Assessment:   Trauma admission  Acute traumatic injuries: non displaced fractures of right posterior 4th and 5th ribs with right pneumothorax  Dementia  Afebrile      Plan:   -Chest tube management per thoracic, possible removal today per notes  -Continue aggressive pulmonary toilet, incentive spirometry  -PT/OT following   -hospitalist following  -Pain control: Dilaudid         Interval History:   Comfortable up seated in chair. Confused. Reports right sided chest pain, controlled. Denies shortness of breath or difficulty breathing.          Physical Exam:   Blood pressure 161/64, pulse 67, temperature 98.1  F (36.7  C), temperature source Oral, resp. rate 18, height 1.626 m (5' 4\"), weight 80.8 kg (178 lb 1.6 oz), SpO2 97 %.    I/O last 3 completed shifts:  In: 650 [P.O.:650]  Out: 1016 [Urine:1000; Chest Tube:16]    General: alert, cooperative, confused, pleasant  HEENT: PERRL, head normocephalic without ecchymosis  Chest: clear to auscultation, heart RRR, no murmurs. Without crepitus or swelling, no ecchymosis or deformity   Abdomen: soft, nondistended, non tender, without masses, +BS  Neuro: grossly intact  Skin: without abrasions or ecchymosis          Data:       Recent Labs  Lab 08/11/18  0753   WBC 13.4*   HGB 13.5   HCT 42.4   *          Recent Labs  Lab 08/11/18  0753      POTASSIUM 4.1   CHLORIDE 103   CO2 26   ANIONGAP 6   *   BUN 21   CR 0.96   GFRESTIMATED 54*   GFRESTBLACK 65   KEVIN 9.0       Palmira Johnston PA-C     I agree with the above assessment and plan.  Debi Medel MD      "

## 2018-08-12 NOTE — PROGRESS NOTES
" 08/12/18 1017   Quick Adds   Type of Visit Initial Occupational Therapy Evaluation   Living Environment   Lives With alone;facility resident   Living Arrangements assisted living   Home Accessibility no concerns   Transportation Available family or friend will provide   Living Environment Comment Per daughter pt is independent in dressing and bathing, gets assist to meals at times, medications managed for her   Self-Care   Dominant Hand right   Usual Activity Tolerance moderate   Current Activity Tolerance fair   Regular Exercise yes   Activity/Exercise Type (pt states she \"does what they ask her to do\", not specific)   Equipment Currently Used at Home walker, rolling   Activity/Exercise/Self-Care Comment pt may also have a shower chair   Functional Level Prior   Ambulation 1-->assistive equipment   Transferring 1-->assistive equipment   Toileting 0-->independent   Bathing 0-->independent   Dressing 0-->independent   Eating 0-->independent   Communication 0-->understands/communicates without difficulty   Swallowing 0-->swallows foods/liquids without difficulty   Cognition 1 - attention or memory deficits   Fall history within last six months yes   Number of times patient has fallen within last six months 1   Which of the above functional risks had a recent onset or change? ambulation;transferring;toileting;bathing;dressing   General Information   Onset of Illness/Injury or Date of Surgery - Date 08/11/18   Referring Physician Debi Medel   Patient/Family Goals Statement return home   Additional Occupational Profile Info/Pertinent History of Current Problem pt admitted after a fall in which she suffered nondisplaced fractures of the posterior 4th and 5th ribs.  Pt has some memory issues, daughter reports that they are getting worse but the family is trying to keep pt out of memory care.  Has some anxiety and anger at times, daughter reports staff at [facility] stay with her a while at times and she calms down. " "  Precautions/Limitations fall precautions   General Observations pt sitting up in chair, sitter and video monitoring present   Cognitive Status Examination   Orientation person   Level of Consciousness alert   Able to Follow Commands mild impairment   Personal Safety (Cognitive) moderate impairment   Memory impaired   Attention No deficits were identified   Visual Perception   Visual Perception Wears glasses   Pain Assessment   Patient Currently in Pain No   Range of Motion (ROM)   ROM Quick Adds No deficits were identified   ROM Comment B UEs   Strength   Manual Muscle Testing Quick Adds Other   Strength Comments general weakness and deconditioning, left side appears slightly stronger   Hand Strength   Hand Strength Comments gross grasp fair and equal   Coordination   Upper Extremity Coordination No deficits were identified   Activities of Daily Living Analysis   Impairments Contributing to Impaired Activities of Daily Living balance impaired;cognition impaired;pain;strength decreased   General Therapy Interventions   Planned Therapy Interventions ADL retraining;cognition;strengthening;transfer training   Clinical Impression   Criteria for Skilled Therapeutic Interventions Met yes, treatment indicated   OT Diagnosis decreased safety and independence for ADLS   Influenced by the following impairments confusion, decreased strength and ROM   Assessment of Occupational Performance 3-5 Performance Deficits   Identified Performance Deficits decreaased independence in dressing, grooming, bathroom transfers   Clinical Decision Making (Complexity) Low complexity   Therapy Frequency daily   Predicted Duration of Therapy Intervention (days/wks) 4 days   Anticipated Discharge Disposition Home with Home Therapy;Transitional Care Facility   Risks and Benefits of Treatment have been explained. Yes   Patient, Family & other staff in agreement with plan of care Yes   [facility] AM-PAC TM \"6 Clicks\"   2016, Trustees of " "[facility], under license to [employer].  All rights reserved.   6 Clicks Short Forms Daily Activity Inpatient Short Form   [facility] AM-PAC  \"6 Clicks\" Daily Activity Inpatient Short Form   1. Putting on and taking off regular lower body clothing? 3 - A Little   2. Bathing (including washing, rinsing, drying)? 3 - A Little   3. Toileting, which includes using toilet, bedpan or urinal? 3 - A Little   4. Putting on and taking off regular upper body clothing? 3 - A Little   5. Taking care of personal grooming such as brushing teeth? 3 - A Little   6. Eating meals? 4 - None   Daily Activity Raw Score (Score out of 24.Lower scores equate to lower levels of function) 19   Total Evaluation Time   Total Evaluation Time (Minutes) 15     "

## 2018-08-12 NOTE — PLAN OF CARE
"Problem: Patient Care Overview  Goal: Plan of Care/Patient Progress Review  PT: Patient seen by physical therapy for evaluation and treatment.  Patient with PMH including dementia now admitted following a fall with sustained trauma to right chest with nondisplaced right 4th and 5th rib fractures, and moderate pneumothorax.  [facility]; per daughter, receives medication management, frequent check ins for comfort cares/re-direction during the day, escort to meals/programs.  Patient has been independent with bathing, toileting and mobility within apartment.  Per daughter, patient participates in daily seated exercises with other residents.  Patient has a 4WW but per daughter, does not like to use the walker and will often walk without it.    Discharge Planner PT   Patient plan for discharge: Daughter would like patient to return to [facility] with previous level of cares.  Daughter reports that if patient needs increased level of services, she requests the \"hospital staff\" to call \"Rimma\" the head nurse at [facility].  Current status: Patient seated in bedside chair upon arrival, on 3 L NC during session.  Patient transferred to standing with 2WW and CGA.  Patient amb 100 feet x2 with 2WW and CGA.  Seated rest break required between walks secondary to reported fatigue.  Patient attempting to pull on chest tube, staff required to redirect patient.  Patient presented with slow gait speed, required intermittent cueing and assist for walker management (walker off to the side, attempting to walk away from walker, etc).  Returned to bedside chair at end of session with verbal cueing for technique.  Barriers to return to prior living situation: high fall risk, cognitive impairments  Recommendations for discharge: Return to [facility] with Home PT,OT and HHA for safety evaluation, strengthening and assistance with bathing  Rationale for recommendations: Patient presents with progressing dementia, seen after fall with injury. "  Patient with high fall risk, would benefit from ongoing physical therapy to increase strength, endurance and independence with mobility.                               Entered by: Gianna Rivas 08/12/2018 1:28 PM

## 2018-08-12 NOTE — PROGRESS NOTES
X-cover    Notified by nurse that pt has malodorous urine.  Labs notable for elevated WBC count.  No fever    Will check UA

## 2018-08-12 NOTE — PLAN OF CARE
Problem: Patient Care Overview  Goal: Plan of Care/Patient Progress Review  Outcome: No Change  Pt. Went down for scheduled chest xray with sitter.. Drsg changed at chest tube site x2. serosanguinous drainage coming from chest tube site. C/O pain 6/10 in right chest. Dilaudid 0.2mg given x2. O2sats 95% on 2L per NC, Resp-18-20, sm. Amt of inc urine, bladder scanned for 450ml. Pt. St. cathed for 1000 ml of cloudy elizabeth urine. Pt. Disorientated to situation. B/P 207/83 when in pain after Dilaudid 168/66. Sitter at bedside

## 2018-08-13 ENCOUNTER — APPOINTMENT (OUTPATIENT)
Dept: PHYSICAL THERAPY | Facility: CLINIC | Age: 83
DRG: 200 | End: 2018-08-13
Payer: MEDICARE

## 2018-08-13 ENCOUNTER — APPOINTMENT (OUTPATIENT)
Dept: OCCUPATIONAL THERAPY | Facility: CLINIC | Age: 83
DRG: 200 | End: 2018-08-13
Payer: MEDICARE

## 2018-08-13 ENCOUNTER — APPOINTMENT (OUTPATIENT)
Dept: GENERAL RADIOLOGY | Facility: CLINIC | Age: 83
DRG: 200 | End: 2018-08-13
Attending: THORACIC SURGERY (CARDIOTHORACIC VASCULAR SURGERY)
Payer: MEDICARE

## 2018-08-13 LAB
ERYTHROCYTE [DISTWIDTH] IN BLOOD BY AUTOMATED COUNT: 12.4 % (ref 10–15)
HCT VFR BLD AUTO: 35.6 % (ref 35–47)
HGB BLD-MCNC: 11.3 G/DL (ref 11.7–15.7)
MCH RBC QN AUTO: 32.7 PG (ref 26.5–33)
MCHC RBC AUTO-ENTMCNC: 31.7 G/DL (ref 31.5–36.5)
MCV RBC AUTO: 103 FL (ref 78–100)
PLATELET # BLD AUTO: 236 10E9/L (ref 150–450)
RBC # BLD AUTO: 3.46 10E12/L (ref 3.8–5.2)
WBC # BLD AUTO: 7.6 10E9/L (ref 4–11)

## 2018-08-13 PROCEDURE — 85027 COMPLETE CBC AUTOMATED: CPT | Performed by: INTERNAL MEDICINE

## 2018-08-13 PROCEDURE — 25000132 ZZH RX MED GY IP 250 OP 250 PS 637: Mod: GY | Performed by: SURGERY

## 2018-08-13 PROCEDURE — 97535 SELF CARE MNGMENT TRAINING: CPT | Mod: GO

## 2018-08-13 PROCEDURE — 40000914 ZZH STATISTIC SITTER, DAY HOURS

## 2018-08-13 PROCEDURE — 25000132 ZZH RX MED GY IP 250 OP 250 PS 637: Mod: GY | Performed by: INTERNAL MEDICINE

## 2018-08-13 PROCEDURE — A9270 NON-COVERED ITEM OR SERVICE: HCPCS | Mod: GY | Performed by: INTERNAL MEDICINE

## 2018-08-13 PROCEDURE — 71046 X-RAY EXAM CHEST 2 VIEWS: CPT

## 2018-08-13 PROCEDURE — 40000193 ZZH STATISTIC PT WARD VISIT: Performed by: PHYSICAL THERAPY ASSISTANT

## 2018-08-13 PROCEDURE — A9270 NON-COVERED ITEM OR SERVICE: HCPCS | Mod: GY | Performed by: SURGERY

## 2018-08-13 PROCEDURE — 99232 SBSQ HOSP IP/OBS MODERATE 35: CPT | Performed by: INTERNAL MEDICINE

## 2018-08-13 PROCEDURE — 97116 GAIT TRAINING THERAPY: CPT | Mod: GP | Performed by: PHYSICAL THERAPY ASSISTANT

## 2018-08-13 PROCEDURE — 40000133 ZZH STATISTIC OT WARD VISIT

## 2018-08-13 PROCEDURE — 12000000 ZZH R&B MED SURG/OB

## 2018-08-13 PROCEDURE — 36415 COLL VENOUS BLD VENIPUNCTURE: CPT | Performed by: INTERNAL MEDICINE

## 2018-08-13 PROCEDURE — 99231 SBSQ HOSP IP/OBS SF/LOW 25: CPT | Performed by: PHYSICIAN ASSISTANT

## 2018-08-13 RX ORDER — VALACYCLOVIR HYDROCHLORIDE 1 G/1
1000 TABLET, FILM COATED ORAL 2 TIMES DAILY
Status: DISCONTINUED | OUTPATIENT
Start: 2018-08-13 | End: 2018-08-15 | Stop reason: HOSPADM

## 2018-08-13 RX ORDER — LABETALOL HYDROCHLORIDE 5 MG/ML
10 INJECTION, SOLUTION INTRAVENOUS
Status: DISCONTINUED | OUTPATIENT
Start: 2018-08-13 | End: 2018-08-15 | Stop reason: HOSPADM

## 2018-08-13 RX ADMIN — POLYETHYLENE GLYCOL 3350 17 G: 17 POWDER, FOR SOLUTION ORAL at 08:46

## 2018-08-13 RX ADMIN — VALACYCLOVIR HYDROCHLORIDE 1000 MG: 1 TABLET, FILM COATED ORAL at 08:45

## 2018-08-13 RX ADMIN — OXYCODONE HYDROCHLORIDE 5 MG: 5 TABLET ORAL at 00:37

## 2018-08-13 RX ADMIN — LOSARTAN POTASSIUM 50 MG: 50 TABLET ORAL at 08:45

## 2018-08-13 RX ADMIN — OXYCODONE HYDROCHLORIDE 5 MG: 5 TABLET ORAL at 04:15

## 2018-08-13 RX ADMIN — LEVOTHYROXINE SODIUM 75 MCG: 75 TABLET ORAL at 08:45

## 2018-08-13 RX ADMIN — SENNOSIDES AND DOCUSATE SODIUM 1 TABLET: 8.6; 5 TABLET ORAL at 08:45

## 2018-08-13 RX ADMIN — ACETAMINOPHEN 650 MG: 325 TABLET, FILM COATED ORAL at 04:15

## 2018-08-13 RX ADMIN — VALACYCLOVIR HYDROCHLORIDE 1000 MG: 1 TABLET, FILM COATED ORAL at 20:33

## 2018-08-13 RX ADMIN — SENNOSIDES AND DOCUSATE SODIUM 1 TABLET: 8.6; 5 TABLET ORAL at 20:33

## 2018-08-13 RX ADMIN — SERTRALINE HYDROCHLORIDE 100 MG: 100 TABLET ORAL at 08:45

## 2018-08-13 ASSESSMENT — ACTIVITIES OF DAILY LIVING (ADL)
ADLS_ACUITY_SCORE: 15
ADLS_ACUITY_SCORE: 12.5
ADLS_ACUITY_SCORE: 15
ADLS_ACUITY_SCORE: 19
ADLS_ACUITY_SCORE: 12.5
ADLS_ACUITY_SCORE: 15

## 2018-08-13 NOTE — PLAN OF CARE
Problem: Patient Care Overview  Goal: Plan of Care/Patient Progress Review  Outcome: Improving  Ambulatory Status:  Pt up Ax2 with walker and GB.   Orientation: Alert to self only   VS: afebrile, B/P196/72, 216/85, 168/59  Pain: c/o pain generalized and back, Oxycodone given x2, Tylenol given x1  Resp: LS clear, dim,Sats above 92% on 2L NC  GI:  Denies nausea. Regular diet.  BS Hypoactive.  Passing flatus.  Last BM unknown.  : voided sm, bladder scanned pt for 566 @ 0400, straight cathed for 900 cloudy elizabeth urine  Skin:  Bruising, rash left on lower back, right chest tube site drsg intact  Tx: new order for Valtrex and PRN Labetalol  Consults:  PT OT SW Thoracic surgery  Disposition:  TBD

## 2018-08-13 NOTE — PLAN OF CARE
Problem: Patient Care Overview  Goal: Plan of Care/Patient Progress Review  Outcome: Improving  Ambulatory Status:  Pt up 1 assist with walker and gait belt. Alarms on for safety. Up in chair most of shift. VPM in place.  Orientation: alert to self only   VS:  VSS  Pain:  denies  Resp: LS dim.  GI:  Jayjay. Regular diet. Hypo BS.  Passing flatus.  Last BM HAROON.  :  Incontinent at times. Bladder scanned for 145.   Skin:  Rash to lower mid/left back-denies pain/burning   Consults:  PT, OT and SW   Disposition:  TBD

## 2018-08-13 NOTE — PROGRESS NOTES
" 08/12/18 0830   Quick Adds   Type of Visit Initial PT Evaluation   Living Environment   Lives With alone;facility resident   Living Arrangements assisted living   Home Accessibility no concerns   Transportation Available agency transportation;family or friend will provide   Living Environment Comment Patient lives in [facility] in [facility], daughter reports that patient gets med management, escort to meals/programs, and comfort cares throughout the day or re-directing patient as needed   Self-Care   Dominant Hand right   Usual Activity Tolerance moderate   Current Activity Tolerance moderate   Regular Exercise yes   Activity/Exercise Type (patient attends a daily seated exercise class)   Equipment Currently Used at Home walker, rolling   Activity/Exercise/Self-Care Comment Daughter reports that patient \"does not like her walker and will refuse to use it at times\"   Functional Level Prior   Ambulation 1-->assistive equipment   Transferring 1-->assistive equipment   Toileting 0-->independent   Bathing 0-->independent   Dressing 0-->independent   Eating 0-->independent   Communication 0-->understands/communicates without difficulty   Swallowing 0-->swallows foods/liquids without difficulty   Cognition 1 - attention or memory deficits   Fall history within last six months yes   Number of times patient has fallen within last six months 1   Which of the above functional risks had a recent onset or change? ambulation;transferring;toileting;bathing;dressing   General Information   Onset of Illness/Injury or Date of Surgery - Date 08/11/18   Referring Physician Debi Medel MD   Patient/Family Goals Statement return to [facility]   Pertinent History of Current Problem (include personal factors and/or comorbidities that impact the POC) Patient with PMH including dementia now admitted following a fall with sustained trauma to right chest with nondisplaced right 4th and 5th rib fractures, and moderate pneumothorax. " "  Cognitive Status Examination   Orientation person   Personal Safety and Judgment impaired;impulsive   Memory impaired   Cognitive Comment Patient with known cognitive deficits   Pain Assessment   Patient Currently in Pain (no reports of pain)   Integumentary/Edema   Integumentary/Edema Comments bruising on Right trunk due to fall   Posture    Posture Forward head position;Protracted shoulders   Range of Motion (ROM)   ROM Comment WFL   Strength   Strength Comments mild strength deficits, activity tolerance deficits during ambulation   Bed Mobility   Bed Mobility Comments min A   Transfer Skills   Transfer Comments CGA with 2WW and verbal cueing   Gait   Gait Comments Patient amb 100 feet x2 with 2WW and CGA   Balance   Balance Comments CGA with all mobility   General Therapy Interventions   Planned Therapy Interventions balance training;bed mobility training;gait training;strengthening;transfer training;home program guidelines;progressive activity/exercise   Clinical Impression   Criteria for Skilled Therapeutic Intervention yes, treatment indicated   PT Diagnosis decreased independence with moiblity   Influenced by the following impairments pain, decreased activity tolerance   Clinical Presentation Stable/Uncomplicated   Clinical Presentation Rationale mildly complex pmh, stable presentation, good social support   Clinical Decision Making (Complexity) Low complexity   Therapy Frequency` 5 times/week   Predicted Duration of Therapy Intervention (days/wks) 5 days   Anticipated Discharge Disposition Long Term Care Facility  ([facility] with home cares)   Risk & Benefits of therapy have been explained Yes   Patient, Family & other staff in agreement with plan of care Yes   [facility] [employer] \"6 Clicks\"   2016, Trustees of [facility], under license to [employer].  All rights reserved.   6 Clicks Short Forms Basic Mobility Inpatient Short Form   [facility] AM-PAC  \"6 Clicks\" V.2 Basic Mobility " Inpatient Short Form   1. Turning from your back to your side while in a flat bed without using bedrails? 3 - A Little   2. Moving from lying on your back to sitting on the side of a flat bed without using bedrails? 3 - A Little   3. Moving to and from a bed to a chair (including a wheelchair)? 3 - A Little   4. Standing up from a chair using your arms (e.g., wheelchair, or bedside chair)? 3 - A Little   5. To walk in hospital room? 3 - A Little   6. Climbing 3-5 steps with a railing? 2 - A Lot   Basic Mobility Raw Score (Score out of 24.Lower scores equate to lower levels of function) 17   Total Evaluation Time   Total Evaluation Time (Minutes) 5

## 2018-08-13 NOTE — PLAN OF CARE
Problem: Patient Care Overview  Goal: Plan of Care/Patient Progress Review  Outcome: Improving  Ambulatory Status:  Pt up Ax2 with walker and GB.   Orientation: Alert to self only   VS:  VSS  Pain:  Denies  Resp: LS CTA  GI:  Denies nausea.  Fair appetite and on Regular diet.  BS Hypoactive.  Passing flatus.  Last BM unknown.  :  Incontinent at times, bladder scanned pt for 83 @ 2120  Skin:  Bruising, rash on lower back  Tx:  Chest tube removed this morning, Sats above 92% on @ L O2 via NC  Consults:  PT OT SW Thoracic surgery  Disposition:  TBD    Will continue to monitor and provide supportive cares.

## 2018-08-13 NOTE — PROGRESS NOTES
"[facility]  General Surgery Progress Note           Assessment and Plan:   Assessment:   Trauma admission  Acute traumatic injuries: non displaced fractures of right posterior 4th and 5th ribs with right pneumothorax  Dementia      Plan:   -Thoracic following, appreciate input  -Continue aggressive pulmonary toilet, incentive spirometry  -PT/OT following   -hospitalist following  -Pain control: Dilaudid  -Home per Thoracic and IM recommendations         Interval History:   Comfortable in chair. Denies pain. Denies shortness of breath or difficulty breathing. Has been up walking with PT/OT. Tolerating regular diet.         Physical Exam:   Blood pressure 161/65, pulse 90, temperature 98.1  F (36.7  C), temperature source Oral, resp. rate 18, height 1.626 m (5' 4\"), weight 80.8 kg (178 lb 1.6 oz), SpO2 95 %.    I/O last 3 completed shifts:  In: 1140 [P.O.:1140]  Out: 950 [Urine:950]    General: alert, cooperative, pleasant  HEENT: head normocephalic without ecchymosis  Chest: clear to auscultation, heart RRR, Without crepitus or swelling, no ecchymosis or deformity. Dressing at previous chest tube site in place on right side.  Abdomen: soft, nondistended, non tender, without masses, +BS          Data:     Chest Xray 8/12/18: IMPRESSION: Interval removal of right chest tube. There is a small  right apical pneumothorax measuring approximately 1.3 cm to the lung  apex. Streaky atelectasis or scarring of the left lung base persists,  with likely small left effusion.      Recent Labs  Lab 08/13/18  0714 08/11/18  0753   WBC 7.6 13.4*   HGB 11.3* 13.5   HCT 35.6 42.4   * 105*    265       Recent Labs  Lab 08/11/18  0753      POTASSIUM 4.1   CHLORIDE 103   CO2 26   ANIONGAP 6   *   BUN 21   CR 0.96   GFRESTIMATED 54*   GFRESTBLACK 65   KEVIN 9.0       Kirk Lorenzo PA-C     Repeat CXR pending official read.  Agree with above.  Ok to DC when PTX stable or resolved and thoracic " surgery agrees.    Debi Medel MD

## 2018-08-13 NOTE — PROGRESS NOTES
[facility]  Hospitalist Progress Note  Alfred Alicea MD 08/13/2018    Reason for Stay (Diagnosis): Mechanical fall, with trauma to chest/rib fracture.         Assessment and Plan:      Summary of Stay: Coty Trotter is a 89 year old female from an assisted living facility, who fell down while going to bed sustained trauma to right chest from 12 4 was in the fifth rib fracture and a moderate pneumothorax on the right side admitted on 8/11/2018 with right fourth and fifth rib fracture with traumatic pneumothorax.    Problem List:   1. Traumatic pneumothorax secondary to right fourth and fifth rib fracture.  -Chest tube management per surgery, is removed today.  -Pain control.  -Continue to monitor in the hospital  -Ambulate the patient, incentive spirometry.    2. Mechanical fall resulting in rib fracture.  -Fall precaution, PT and OT evaluation as chest tube is removed.    3. Hypertension.  - Monitor vitals, continue losartan 50 mg daily.    4. Dementia- Patient has significant dementia, she is not oriented to time place person, she is pleasantly confused.  She has no agitation or anxiety   -She has video monitor, at this point no need for a sitter.  -Fall precautions.    # Pain Assessment:  Current Pain Score 8/13/2018   Patient currently in pain? denies   Pain score (0-10) -   Pain location -   Pain descriptors -   Coty ferro pain level was assessed and she currently denies pain.      DVT Prophylaxis: Pneumatic Compression Devices  Code Status: Full Code  Discharge Dispo: Patient lives in an assisted facility, alone, needs support.  Estimated Disch Date / # of Days until Disch: 1 day pending physical therapy, she will likely benefit from TCU as she is still weak and has increased risk of fall.        Interval History (Subjective):      Patient seen and examined, no new overnight issues, no pain, nausea or vomiting.  Patient is still confused, but cooperative and pleasant.                  "   Physical Exam:      Last Vital Signs:  /57 (BP Location: Right arm)  Pulse 90  Temp 97.5  F (36.4  C) (Oral)  Resp 20  Ht 1.626 m (5' 4\")  Wt 80.8 kg (178 lb 1.6 oz)  SpO2 99%  BMI 30.57 kg/m2    I/O last 3 completed shifts:  In: 1140 [P.O.:1140]  Out: 950 [Urine:950]  Wt Readings from Last 1 Encounters:   08/11/18 80.8 kg (178 lb 1.6 oz)     Current Facility-Administered Medications   Medication     acetaminophen (TYLENOL) tablet 650 mg     alum & mag hydroxide-simethicone (MYLANTA ES/MAALOX  ES) suspension 30 mL     bisacodyl (DULCOLAX) EC tablet 5 mg    Or     bisacodyl (DULCOLAX) EC tablet 10 mg    Or     bisacodyl (DULCOLAX) EC tablet 15 mg     bisacodyl (DULCOLAX) Suppository 10 mg     HYDROmorphone (PF) (DILAUDID) injection 0.2 mg     labetalol (NORMODYNE/TRANDATE) injection 10 mg     levothyroxine (SYNTHROID/LEVOTHROID) tablet 75 mcg     lidocaine (LMX4) kit     lidocaine 1 % 1 mL     losartan (COZAAR) tablet 50 mg     magnesium sulfate 4 g in 100 mL sterile water (premade)     melatonin tablet 1 mg     naloxone (NARCAN) injection 0.1-0.4 mg     ondansetron (ZOFRAN-ODT) ODT tab 4 mg    Or     ondansetron (ZOFRAN) injection 4 mg     oxyCODONE IR (ROXICODONE) tablet 5-10 mg     polyethylene glycol (MIRALAX/GLYCOLAX) Packet 17 g     polyethylene glycol (MIRALAX/GLYCOLAX) Packet 17 g     potassium chloride (KLOR-CON) Packet 20-40 mEq     potassium chloride 10 mEq in 100 mL intermittent infusion with 10 mg lidocaine     potassium chloride 10 mEq in 100 mL sterile water intermittent infusion (premix)     potassium chloride SA (K-DUR/KLOR-CON M) CR tablet 20-40 mEq     prochlorperazine (COMPAZINE) injection 5 mg    Or     prochlorperazine (COMPAZINE) tablet 5 mg    Or     prochlorperazine (COMPAZINE) Suppository 12.5 mg     senna-docusate (SENOKOT-S;PERICOLACE) 8.6-50 MG per tablet 1 tablet    Or     senna-docusate (SENOKOT-S;PERICOLACE) 8.6-50 MG per tablet 2 tablet     sertraline (ZOLOFT) tablet " 100 mg     sodium chloride (PF) 0.9% PF flush 3 mL     sodium chloride (PF) 0.9% PF flush 3 mL     valACYclovir (VALTREX) tablet 1,000 mg       Constitutional: Awake, alert, cooperative, no apparent distress   Respiratory: Clear to auscultation bilaterally, no crackles or wheezing   Cardiovascular: Regular rate and rhythm, normal S1 and S2, and no murmur noted   Abdomen: Normal bowel sounds, soft, non-distended, non-tender   Skin: No rashes, no cyanosis, dry to touch   Neuro: Alert and oriented x3, no weakness, numbness, memory loss   Extremities: No edema, normal range of motion   Other(s):HEENT Pink, un icteric scleral.       All other systems: Negative          Medications:      All current medications were reviewed with changes reflected in problem list.         Data:      All new lab and imaging data was reviewed.   Labs:  No results for input(s): CULT in the last 168 hours.    Recent Labs  Lab 08/11/18  0753      POTASSIUM 4.1   CHLORIDE 103   CO2 26   ANIONGAP 6   *   BUN 21   CR 0.96   GFRESTIMATED 54*   GFRESTBLACK 65   KEVIN 9.0       Recent Labs  Lab 08/13/18  0714 08/11/18  0753   WBC 7.6 13.4*   HGB 11.3* 13.5   HCT 35.6 42.4   * 105*    265       Recent Labs  Lab 08/11/18  0753   *      Imaging:   Results for orders placed or performed during the hospital encounter of 08/11/18   Head CT w/o contrast    Narrative    CT HEAD W/O CONTRAST 8/11/2018 6:32 AM    HISTORY: Fall, evaluate for intracranial hemorrhage.    COMPARISON: 11/25/2015    TECHNIQUE: Noncontrast head CT.  Radiation dose for this scan was  reduced using automated exposure control, adjustment of the mA and/or  kV according to patient size, or iterative reconstruction technique.    FINDINGS: No intracranial hemorrhage. No abnormal extra-axial fluid  collection. Midline is maintained. Ventricular volumes are stable.  Chronic generalized atrophy. Calvarium is intact. Sinuses and mastoid  air cells are normally  aerated.      Impression    IMPRESSION: No acute abnormality.    MYAH DAVID MD   Cervical spine CT w/o contrast    Narrative    CT CERVICAL SPINE W/O CONTRAST 8/11/2018 6:40 AM    HISTORY: Fall, evaluate for fracture.    COMPARISON: 11/25/2015    TECHNIQUE: Noncontrast cervical spine CT.  Radiation dose for this  scan was reduced using automated exposure control, adjustment of the  mA and/or kV according to patient size, or iterative reconstruction  technique.    FINDINGS: No acute fracture or malalignment. Chronic degenerative  changes at C5-C6-C7, stable in comparison with 11/25/2015.  Prevertebral soft tissues are normal. No acute soft tissue  abnormality.      Impression    IMPRESSION: No acute abnormality.    MYAH DAVID MD   CT Thoracic Spine w/o Contrast    Narrative    CT THORACIC SPINE W/O CONTRAST 8/11/2018 6:47 AM    HISTORY: Tenderness after fall.    COMPARISON: None.    TECHNIQUE: Noncontrast thoracic spine CT.  Radiation dose for this  scan was reduced using automated exposure control, adjustment of the  mA and/or kV according to patient size, or iterative reconstruction  technique.    FINDINGS: Nondisplaced fractures of the right posterior fourth and  fifth ribs and moderate right pneumothorax are better visualized on  dedicated chest CT. Thoracic vertebral bodies appear intact. No  malalignment. Multilevel loss of disc space associated with marginal  osteophyte formation.       Impression    IMPRESSION:   1. Degenerative change in the thoracic spine without acute thoracic  spine abnormality.  2. Nondisplaced fractures of the right posterior fourth and fifth ribs  with moderate right pneumothorax, better visualized on dedicated chest  CT.    MYAH DAVID MD   CT Chest w/o Contrast    Narrative    CT CHEST W/O CONTRAST 8/11/2018 6:40 AM    HISTORY: Fall, rib pain.    COMPARISON: None.    TECHNIQUE: Noncontrast chest CT.  Radiation dose for this scan was  reduced using automated exposure control,  adjustment of the mA and/or  kV according to patient size, or iterative reconstruction technique.    FINDINGS: Nondisplaced fractures of the right posterior fourth and  fifth ribs. Moderate right pneumothorax. Left-sided ribs appear intact  as do the remaining visualized osseous structures.    Moderate cardiomegaly without pericardial effusion. Patchy coronary  vascular calcification. No mediastinal or hilar adenopathy.    No acute abnormality in the visualized upper abdomen.      Impression    IMPRESSION: Nondisplaced fractures of the right posterior fourth and  fifth ribs with associated moderate right pneumothorax.    MYAH DAVID MD   Chest XR,  PA & LAT    Narrative    XR CHEST 2 VW 8/11/2018 7:31 AM    HISTORY: Pneumothorax.    COMPARISON: None.      Impression    IMPRESSION: Mild to moderate right apical pneumothorax. The left lung  appears clear. No pleural effusions appreciated. Mild cardiomegaly.    KURT ESPARZA MD   Chest  XR, 1 view PORTABLE    Narrative    CHEST ONE VIEW PORTABLE  8/11/2018 9:21 AM     HISTORY:  Chest tube placement.      COMPARISON: Earlier today.    FINDINGS: Mild elevation of the left hemidiaphragm. Superior  translation of the left humeral head, presumably related to rotator  cuff pathology.      Impression    IMPRESSION:  Placement of a right chest tube. Decreased right  pneumothorax, currently small.    JONG MARRERO MD   XR Chest 2 Views    Narrative    XR CHEST 2 VW 8/12/2018 7:05 AM    HISTORY: Rib fractures.    COMPARISON: August 11, 2018.      Impression    IMPRESSION: Right chest tube in place as before, with bibasilar  atelectasis, left greater than right. The previously noted right  pneumothorax is not well appreciated on today's examination. Stable  mild cardiomegaly.    KURT ESPARZA MD   XR Chest 2 Views    Narrative    XR CHEST 2 VW 8/12/2018 11:23 AM    HISTORY: Pneumothorax.    COMPARISON: August 12, 2018.      Impression    IMPRESSION: Interval removal of right  chest tube. There is a small  right apical pneumothorax measuring approximately 1.3 cm to the lung  apex. Streaky atelectasis or scarring of the left lung base persists,  with likely small left effusion.    KURT ESPARZA MD

## 2018-08-13 NOTE — CONSULTS
Care Transition Initial Assessment -   Reason For Consult: discharge planning  Met with: Patient and Family  Spoke with daughter Jaqueline   Active Problems:    Pneumothorax, acute       DATA  Lives With: alone, facility resident  Living Arrangements: assisted living [facility]   Description of Support System: Supportive  Who is your support system?: Facility resident(s)/Staff  Support Assessment: Adequate family and caregiver support, Adequate social supports. Pt has support for meals, meds, cleaning and escort.   Identified issues/concerns regarding health management: Pt admitted from her [facility] due to Pneumothorax.. Pt is moving well with staff.       Resources List: Assisted Living ([facility] )  Spoke with mauro HERNANDEZ at [facility] [phone] fax [phone]     Quality Of Family Relationships: supportive  Transportation Available: family or friend will provide  Family have requested WC transport.   ASSESSMENT  Cognitive Status:  Awake- Pt does have dementia  Concerns to be addressed: spoke with staff at [facility]. They would agree that pt does need increased support and would welcome home care and would prefer [facility]. Pt does have a walker but refused to use it when at home.. Spoke with daughter who is agreeable to home care as well. She has requested WC transport at pt due to her dementia tends to be angry at her daughter all the time.       PLAN  Will refer to [facility] [place] for RN.PT./OT.JAIMIE support  HE transport will fax DC order to geovanny Hernandez at [facility]

## 2018-08-13 NOTE — PLAN OF CARE
"Problem: Patient Care Overview  Goal: Plan of Care/Patient Progress Review  Discharge Planner PT   Patient plan for discharge: Daughter would like patient to return to [facility] with previous level of cares.  Daughter reports that if patient needs increased level of services, she requests the \"hospital staff\" to call \"Rimma\" the head nurse at [facility].  Current status: Pt amb 215' with ww with CGA. Vcs for pt to amb closer to ww for safety. Note pt to amb with = step lengthand flexed posture. Note O2 sats to be @ beginning 97 with 2L of O2 following amb sats decreased to 95% - 97%. Pt transfers sit to/from stand with CGA with vcs for hand placement for safety.   Barriers to return to prior living situation: high fall risk, cognitive impairments  Recommendations for discharge: Return to [facility] with Home PT,OT and HHA for safety evaluation, strengthening and assistance with bathing per plan established by the PT.    Rationale for recommendations: Patient presents with progressing dementia, seen after fall with injury.  Patient with high fall risk, would benefit from ongoing physical therapy to increase strength, endurance and independence with mobility.  Continue to agree with rationale for recommendation.                              Entered by: Yadira Mariscal 08/13/2018 3:49 PM           "

## 2018-08-13 NOTE — PLAN OF CARE
Problem: Patient Care Overview  Goal: Plan of Care/Patient Progress Review  Discharge Planner OT   Patient plan for discharge: not stated  Current status: Sit>stand CGA with 2ww, ambulated to bathroom CGA with 2ww and cuing for safe 2ww use. Simple 1 step commands used with 75% success. Sit<>stand comfort height toilet CGA and cuing for pericares. Cuing needed to redirect pt to sink as per plan; pt completes hand hygiene CGA; cuing needed again to remind pt of plan for g/h cares. Pt needing direct verbal instruction to use toothpaste; pt initially going to use deodorant to brush teeth (difficulty in novel environment). Completed 2 g/h tasks CGA; following pt ambulates back to chair CGA with 2ww, alarm armed at end of session.  Barriers to return to prior living situation: impaired cognition, increased level of assist  Recommendations for discharge: return to nursing home with supervision/assist for all ADLs and HH OT; if [facility] unable to provide pt may benefit from memory care given progressive cognitive deficits and increased need for supervision and assist  Rationale for recommendations: Pt is unsafe to return home d/t impaired cognition and increased need for A in ADL/functional mobility tasks; would benefit from continued OT services to maximize safety and IND in daily activities       Entered by: Akilah Sands 08/13/2018 4:25 PM

## 2018-08-13 NOTE — PROGRESS NOTES
Cross coverage note     paged by nursing to evaluate new onset of a rash/blisters on the left lower back.    Patient is comfortable at this time though she just received pain medication.  She does have a small vesicular rash on her left lower back overlying an erythematous base.  I believe it is reasonable to prescribe her some Valtrex for possible shingles which she has had before.  Would restrict pregnant visitors/staff

## 2018-08-14 ENCOUNTER — APPOINTMENT (OUTPATIENT)
Dept: OCCUPATIONAL THERAPY | Facility: CLINIC | Age: 83
DRG: 200 | End: 2018-08-14
Payer: MEDICARE

## 2018-08-14 ENCOUNTER — APPOINTMENT (OUTPATIENT)
Dept: PHYSICAL THERAPY | Facility: CLINIC | Age: 83
DRG: 200 | End: 2018-08-14
Payer: MEDICARE

## 2018-08-14 PROCEDURE — 97530 THERAPEUTIC ACTIVITIES: CPT | Mod: GP | Performed by: PHYSICAL THERAPY ASSISTANT

## 2018-08-14 PROCEDURE — 12000000 ZZH R&B MED SURG/OB

## 2018-08-14 PROCEDURE — 97535 SELF CARE MNGMENT TRAINING: CPT | Mod: GO

## 2018-08-14 PROCEDURE — 40000133 ZZH STATISTIC OT WARD VISIT

## 2018-08-14 PROCEDURE — 25000132 ZZH RX MED GY IP 250 OP 250 PS 637: Mod: GY | Performed by: SURGERY

## 2018-08-14 PROCEDURE — 25000132 ZZH RX MED GY IP 250 OP 250 PS 637: Mod: GY | Performed by: INTERNAL MEDICINE

## 2018-08-14 PROCEDURE — 40000193 ZZH STATISTIC PT WARD VISIT: Performed by: PHYSICAL THERAPY ASSISTANT

## 2018-08-14 PROCEDURE — 97530 THERAPEUTIC ACTIVITIES: CPT | Mod: GO

## 2018-08-14 PROCEDURE — A9270 NON-COVERED ITEM OR SERVICE: HCPCS | Mod: GY | Performed by: SURGERY

## 2018-08-14 PROCEDURE — A9270 NON-COVERED ITEM OR SERVICE: HCPCS | Mod: GY | Performed by: INTERNAL MEDICINE

## 2018-08-14 PROCEDURE — 99232 SBSQ HOSP IP/OBS MODERATE 35: CPT | Performed by: INTERNAL MEDICINE

## 2018-08-14 RX ADMIN — SENNOSIDES AND DOCUSATE SODIUM 1 TABLET: 8.6; 5 TABLET ORAL at 08:52

## 2018-08-14 RX ADMIN — ACETAMINOPHEN 650 MG: 325 TABLET, FILM COATED ORAL at 01:15

## 2018-08-14 RX ADMIN — LOSARTAN POTASSIUM 50 MG: 50 TABLET ORAL at 08:52

## 2018-08-14 RX ADMIN — VALACYCLOVIR HYDROCHLORIDE 1000 MG: 1 TABLET, FILM COATED ORAL at 08:52

## 2018-08-14 RX ADMIN — OXYCODONE HYDROCHLORIDE 5 MG: 5 TABLET ORAL at 01:16

## 2018-08-14 RX ADMIN — VALACYCLOVIR HYDROCHLORIDE 1000 MG: 1 TABLET, FILM COATED ORAL at 20:47

## 2018-08-14 RX ADMIN — LEVOTHYROXINE SODIUM 75 MCG: 75 TABLET ORAL at 08:52

## 2018-08-14 RX ADMIN — POLYETHYLENE GLYCOL 3350 17 G: 17 POWDER, FOR SOLUTION ORAL at 08:52

## 2018-08-14 RX ADMIN — SERTRALINE HYDROCHLORIDE 100 MG: 100 TABLET ORAL at 08:52

## 2018-08-14 ASSESSMENT — ACTIVITIES OF DAILY LIVING (ADL)
ADLS_ACUITY_SCORE: 19

## 2018-08-14 NOTE — PLAN OF CARE
Problem: Patient Care Overview  Goal: Plan of Care/Patient Progress Review  Outcome: No Change  Pt remains admitted for rib fx and shingles  A/O to self, pleasant  No pain or nausea reported  O2 2L   High BP of 185/59, recheck 141/50 no interventions needed  VPM in place  Dressing in place on right chest, clean dry and intact  Shingles rash localized and red  Up x1 with walker/gaitbelt in chair most of shift  Regular diet  Discharge pending  Will continue to monitor

## 2018-08-14 NOTE — PLAN OF CARE
Problem: Patient Care Overview  Goal: Plan of Care/Patient Progress Review  Discharge Planner OT   Patient plan for discharge: None stated  Current status: Pt completed bed mobility supine > sit EOB with SBA. Pt ambulated to/from BR with use of FWW with CGA for safety, required vcs for safe management of FWW and body positioning within FWW. Pt completed toileting tasks with CGA and vcs to initiate hygiene tasks, flush toilet, etc. Pt able to complete hand washing, vcs required for locating soap, paper towels and garbage can - redirection required, likely due to novel environment. Pt positioning upright in chair with chair alarm on, set up provided for breakfast task.   Barriers to return to prior living situation: Current level of assist, impaired cognition/safety, history of falls  Recommendations for discharge: Return to [facility] with supervision/assist for all ADLs and HH OT; if [facility] unable to provide pt may benefit from memory care given progressive cognitive deficits and increased need for supervision and assist  Rationale for recommendations: Pt would benefit from continued skilled OT to maximize safety and in all ADls and mobility tasks - recommend increased assist in [facility] environment due to impaired cognition, history of falls and current need for assist.        Entered by: Melinda Loja 08/14/2018 8:16 AM

## 2018-08-14 NOTE — PLAN OF CARE
Problem: Patient Care Overview  Goal: Plan of Care/Patient Progress Review  Discharge Planner PT   Patient plan for discharge: unable to state  Current status: 1 assist to SBA for mobility cues needed for RW use ( has 4ww) at baseline declined further gait due to bladder issues. Did walk 25 feet x 2  Barriers to return to prior living situation: safety but maybe at baseline  Recommendations for discharge: Return to [facility] with Home PT,OT and HHA for safety evaluation, strengthening and assistance with bathing per plan established by the PT.    Rationale for recommendations: maybe at baseline in mobility currently TCU would not address/ influance memory issues or influence safety needs for decreased falls risk.       Entered by: Wendy Bailon 08/14/2018 11:52 AM

## 2018-08-14 NOTE — PLAN OF CARE
Problem: Patient Care Overview  Goal: Plan of Care/Patient Progress Review  Outcome: No Change  Ambulatory Status:  Pt up Ax2 with walker and GB.   Orientation: Alert to self only   VS: afebrile, B/P 172/58, 153/65  Pain: c/o pain generalized and back, Oxycodone given x1, Tylenol given x1, Dilaudid given x1  Resp: LS clear, dim,Sats above 97% on 2L NC  GI:  Denies nausea. Regular diet.  BS +.  Passing flatus.  Last BM this shift.  : voided sm, bladder scanned for 785, straight cathed for 800 clear elizabeth urine  Skin:  Bruising, rash left on lower back, left inner thigh right chest tube site drsg intact, feet red  Tx: Valtrex, Pain control   Consults:  PT OT SW   Disposition:  TBD

## 2018-08-14 NOTE — PLAN OF CARE
Problem: Patient Care Overview  Goal: Plan of Care/Patient Progress Review  Outcome: Improving  Pt continues to be hospitalized for rib fractures/pneumothorax. VSS. Alert to self only. Pt denies pain this shift. Shingles on lower back covered with dry gauze to protect. MD aware of shingles on L upper, inner thigh. No need for airborne precautions. Up to void x3 this shift, bladder scanned for 19ml. Set off VPM alarm x1 when needing to void. Up assist 1 walker. PT/OT/Surgery following. Discharge likely tomorrow back to AL facility with increased support.

## 2018-08-14 NOTE — PROGRESS NOTES
"[facility]  General Surgery Progress Note           Assessment and Plan:   Assessment:   Trauma admission  Acute traumatic injuries: non displaced fractures of right posterior 4th and 5th ribs with right pneumothorax  Dementia  Afebrile      Plan:   -Thoracic following, appreciate input. OK to discharge when cleared by thoracic.  -Continue aggressive pulmonary toilet, incentive spirometry  -PT/OT following. Recommend discharge to [facility] with increased assist  -hospitalist following  -SW following  -Pain control: Dilaudid  -Home per Thoracic and IM recommendations, per hospitalist, likely discharge tomorrow.          Interval History:   Comfortable in chair. Denies pain. Denies shortness of breath or difficulty breathing. Has been up walking with PT/OT. Tolerating regular diet.         Physical Exam:   Blood pressure 110/51, pulse 66, temperature 96.4  F (35.8  C), temperature source Oral, resp. rate 18, height 1.626 m (5' 4\"), weight 80.8 kg (178 lb 1.6 oz), SpO2 96 %.    I/O last 3 completed shifts:  In: 490 [P.O.:490]  Out: 1000 [Urine:1000]    General: alert, cooperative, pleasant  HEENT: head normocephalic without ecchymosis  Chest: clear to auscultation, heart RRR, Without crepitus or swelling, no ecchymosis or deformity. Dressing at previous chest tube site in place on right side.  Abdomen: soft, nondistended, non tender, without masses, +BS          Data:     Chest Xray 8/12/18: IMPRESSION: Interval removal of right chest tube. There is a small  right apical pneumothorax measuring approximately 1.3 cm to the lung  apex. Streaky atelectasis or scarring of the left lung base persists,  with likely small left effusion.      Recent Labs  Lab 08/13/18  0714 08/11/18  0753   WBC 7.6 13.4*   HGB 11.3* 13.5   HCT 35.6 42.4   * 105*    265       Recent Labs  Lab 08/11/18  0753      POTASSIUM 4.1   CHLORIDE 103   CO2 26   ANIONGAP 6   *   BUN 21   CR 0.96   GFRESTIMATED 54* "   GFRESTTreadwell 65   KEVIN 9.0       Palmira Johnston PA-C     I agree with the above assessment and plan.  Debi Medel MD

## 2018-08-14 NOTE — PLAN OF CARE
Problem: Patient Care Overview  Goal: Plan of Care/Patient Progress Review  Discharge Planner OT      Pt is a 89 year old female admitted after a fall in which she suffered nondisplaced fractures of the posterior 4th and 5th ribs.  Pt has some memory issues, daughter reports that they are getting worse but the family is trying to keep pt out of memory care.  Has some anxiety and anger at times, daughter reports staff at [facility] stay with her a while at times and she calms down.  Family is trying to keep her in the [facility] despite some confusion.    Patient plan for discharge: Not states at this time.  Current status: Initial evaluation complete.  Treatment started for ADLS.   Pt sitting up in a chair, willing to participate.  Pt chatty and repeating herself about how she moved to [place] for 2 years and then came back to be close to her family.  Has daughter in town who looks after her at times.  Per conversation PT had with daugther pt gets angry with her at times and pt did state during the session that she did not like her daugther but liked her son-in-law.  Daughter also reports pt gets anxious and angry fairly often, staff at [facility] will come sit with her when this happens which helps.  Pt not really able to give much background information besides moving and her relationship with her daugther.  Did stand with SBA and Min verbal cues to use the walker to get into the bathroom.  Sat with SBA.  Attempted LE dressing sitting in chair.  Got right sock off but could not reach left.  Desated with effort, complaining of some pain.  O2 sats dropped briefly into the mid 80s%.  Returned to better level as soon as pt sat back up.  Will trial AE for dressing to see if pt  can understand how to use AE and be more independent with injury.  Barriers to return to prior living situation: Confusion, current level of functioning  Recommendations for discharge: return to [facility] with supervision/assist for all ADLs and HH OT; if [facility]  unable to provide pt may benefit from memory care given progressive cognitive deficits and increased need for supervision and assist  Rationale for recommendations: Pt unable to care for self without supervision due to cognitive status and anger.  Would benefit from continued OT services to maximize safety and IND in daily activities       Entered by: Reji Leary 08/14/2018 11:42 AM

## 2018-08-14 NOTE — PROGRESS NOTES
[facility]  Hospitalist Progress Note  Alfred Alicea MD 08/14/2018    Reason for Stay (Diagnosis): Mechanical fall, with trauma to chest/rib fracture.         Assessment and Plan:      Summary of Stay: Coty Trotter is a 89 year old female from an assisted living facility, who fell down while going to bed sustained trauma to right chest from 12 4 was in the fifth rib fracture and a moderate pneumothorax on the right side admitted on 8/11/2018 with right fourth and fifth rib fracture with traumatic pneumothorax.    Problem List:   1. Traumatic pneumothorax secondary to right fourth and fifth rib fracture.  -Chest tube management per surgery, is removed today.  -Pain control.  -Continue to monitor in the hospital  -Ambulate the patient, incentive spirometry.    2. Mechanical fall resulting in rib fracture.  -Fall precaution, PT/OT evaluation as chest tube is removed.    3. Hypertension.  - Monitor vitals, continue losartan 50 mg daily.    4. Dementia- Patient has significant dementia, she is not oriented to time place person, she is pleasantly confused.    - She has no agitation or anxiety   -She has video monitor, at this point no need for a sitter.  -Fall precautions.    5.  Herpes zoster- Rrash with Blister sacral area on the right side, and it follows a dermatome, S2, Small blisters overlying erythematous base.  - Continue Valtrex    # Pain Assessment:  Current Pain Score 8/14/2018   Patient currently in pain? yes   Pain score (0-10) -   Pain location Back   Pain descriptors -   Coty ferro pain level was assessed and she currently denies pain.      DVT Prophylaxis: Pneumatic Compression Devices  Code Status: Full Code  Discharge Dispo: Patient lives in an assisted facility, alone, needs support.  Estimated Disch Date / # of Days until Disch: 1 day to assisted-living facility with PT/OT and home health aide.        Interval History (Subjective):      Patient seen and examined, no new  "overnight issues, no pain, nausea or vomiting.  Patient is still confused, does not give details or answers to particular questions, but cooperative and pleasant.                    Physical Exam:      Last Vital Signs:  /65 (BP Location: Right arm)  Pulse 66  Temp 96.4  F (35.8  C) (Oral)  Resp 18  Ht 1.626 m (5' 4\")  Wt 80.8 kg (178 lb 1.6 oz)  SpO2 99%  BMI 30.57 kg/m2    I/O last 3 completed shifts:  In: 490 [P.O.:490]  Out: 1000 [Urine:1000]  Wt Readings from Last 1 Encounters:   08/11/18 80.8 kg (178 lb 1.6 oz)     Current Facility-Administered Medications   Medication     acetaminophen (TYLENOL) tablet 650 mg     alum & mag hydroxide-simethicone (MYLANTA ES/MAALOX  ES) suspension 30 mL     bisacodyl (DULCOLAX) EC tablet 5 mg    Or     bisacodyl (DULCOLAX) EC tablet 10 mg    Or     bisacodyl (DULCOLAX) EC tablet 15 mg     bisacodyl (DULCOLAX) Suppository 10 mg     HYDROmorphone (PF) (DILAUDID) injection 0.2 mg     labetalol (NORMODYNE/TRANDATE) injection 10 mg     levothyroxine (SYNTHROID/LEVOTHROID) tablet 75 mcg     lidocaine (LMX4) kit     lidocaine 1 % 1 mL     losartan (COZAAR) tablet 50 mg     magnesium sulfate 4 g in 100 mL sterile water (premade)     melatonin tablet 1 mg     naloxone (NARCAN) injection 0.1-0.4 mg     ondansetron (ZOFRAN-ODT) ODT tab 4 mg    Or     ondansetron (ZOFRAN) injection 4 mg     oxyCODONE IR (ROXICODONE) tablet 5-10 mg     polyethylene glycol (MIRALAX/GLYCOLAX) Packet 17 g     polyethylene glycol (MIRALAX/GLYCOLAX) Packet 17 g     potassium chloride (KLOR-CON) Packet 20-40 mEq     potassium chloride 10 mEq in 100 mL intermittent infusion with 10 mg lidocaine     potassium chloride 10 mEq in 100 mL sterile water intermittent infusion (premix)     potassium chloride SA (K-DUR/KLOR-CON M) CR tablet 20-40 mEq     prochlorperazine (COMPAZINE) injection 5 mg    Or     prochlorperazine (COMPAZINE) tablet 5 mg    Or     prochlorperazine (COMPAZINE) Suppository 12.5 mg     " senna-docusate (SENOKOT-S;PERICOLACE) 8.6-50 MG per tablet 1 tablet    Or     senna-docusate (SENOKOT-S;PERICOLACE) 8.6-50 MG per tablet 2 tablet     sertraline (ZOLOFT) tablet 100 mg     sodium chloride (PF) 0.9% PF flush 3 mL     sodium chloride (PF) 0.9% PF flush 3 mL     valACYclovir (VALTREX) tablet 1,000 mg       Constitutional: Awake, alert, cooperative, no apparent distress   Respiratory: Clear to auscultation bilaterally, no crackles or wheezing   Cardiovascular: Regular rate and rhythm, normal S1 and S2, and no murmur noted   Abdomen: Normal bowel sounds, soft, non-distended, non-tender   Skin: Positive rash sacral area to the right following dermatome S2, with small blisters on erythematous base, no cyanosis or clubbing, dry to touch   Neuro: Alert and oriented x1, no weakness, numbness, ++memory loss.   Extremities: No edema, normal range of motion   Other(s):HEENT Pink, un icteric scleral, moist oral mucosa       All other systems: Negative          Medications:      All current medications were reviewed with changes reflected in problem list.         Data:      All new lab and imaging data was reviewed.   Labs:  No results for input(s): CULT in the last 168 hours.    Recent Labs  Lab 08/11/18  0753      POTASSIUM 4.1   CHLORIDE 103   CO2 26   ANIONGAP 6   *   BUN 21   CR 0.96   GFRESTIMATED 54*   GFRESTBLACK 65   KEVIN 9.0       Recent Labs  Lab 08/13/18  0714 08/11/18  0753   WBC 7.6 13.4*   HGB 11.3* 13.5   HCT 35.6 42.4   * 105*    265       Recent Labs  Lab 08/11/18  0753   *      Imaging:   Results for orders placed or performed during the hospital encounter of 08/11/18   Head CT w/o contrast    Narrative    CT HEAD W/O CONTRAST 8/11/2018 6:32 AM    HISTORY: Fall, evaluate for intracranial hemorrhage.    COMPARISON: 11/25/2015    TECHNIQUE: Noncontrast head CT.  Radiation dose for this scan was  reduced using automated exposure control, adjustment of the mA  and/or  kV according to patient size, or iterative reconstruction technique.    FINDINGS: No intracranial hemorrhage. No abnormal extra-axial fluid  collection. Midline is maintained. Ventricular volumes are stable.  Chronic generalized atrophy. Calvarium is intact. Sinuses and mastoid  air cells are normally aerated.      Impression    IMPRESSION: No acute abnormality.    MYAH DAVID MD   Cervical spine CT w/o contrast    Narrative    CT CERVICAL SPINE W/O CONTRAST 8/11/2018 6:40 AM    HISTORY: Fall, evaluate for fracture.    COMPARISON: 11/25/2015    TECHNIQUE: Noncontrast cervical spine CT.  Radiation dose for this  scan was reduced using automated exposure control, adjustment of the  mA and/or kV according to patient size, or iterative reconstruction  technique.    FINDINGS: No acute fracture or malalignment. Chronic degenerative  changes at C5-C6-C7, stable in comparison with 11/25/2015.  Prevertebral soft tissues are normal. No acute soft tissue  abnormality.      Impression    IMPRESSION: No acute abnormality.    MYAH DAVID MD   CT Thoracic Spine w/o Contrast    Narrative    CT THORACIC SPINE W/O CONTRAST 8/11/2018 6:47 AM    HISTORY: Tenderness after fall.    COMPARISON: None.    TECHNIQUE: Noncontrast thoracic spine CT.  Radiation dose for this  scan was reduced using automated exposure control, adjustment of the  mA and/or kV according to patient size, or iterative reconstruction  technique.    FINDINGS: Nondisplaced fractures of the right posterior fourth and  fifth ribs and moderate right pneumothorax are better visualized on  dedicated chest CT. Thoracic vertebral bodies appear intact. No  malalignment. Multilevel loss of disc space associated with marginal  osteophyte formation.       Impression    IMPRESSION:   1. Degenerative change in the thoracic spine without acute thoracic  spine abnormality.  2. Nondisplaced fractures of the right posterior fourth and fifth ribs  with moderate right  pneumothorax, better visualized on dedicated chest  CT.    MYAH DAVID MD   CT Chest w/o Contrast    Narrative    CT CHEST W/O CONTRAST 8/11/2018 6:40 AM    HISTORY: Fall, rib pain.    COMPARISON: None.    TECHNIQUE: Noncontrast chest CT.  Radiation dose for this scan was  reduced using automated exposure control, adjustment of the mA and/or  kV according to patient size, or iterative reconstruction technique.    FINDINGS: Nondisplaced fractures of the right posterior fourth and  fifth ribs. Moderate right pneumothorax. Left-sided ribs appear intact  as do the remaining visualized osseous structures.    Moderate cardiomegaly without pericardial effusion. Patchy coronary  vascular calcification. No mediastinal or hilar adenopathy.    No acute abnormality in the visualized upper abdomen.      Impression    IMPRESSION: Nondisplaced fractures of the right posterior fourth and  fifth ribs with associated moderate right pneumothorax.    MYAH DAVID MD   Chest XR,  PA & LAT    Narrative    XR CHEST 2 VW 8/11/2018 7:31 AM    HISTORY: Pneumothorax.    COMPARISON: None.      Impression    IMPRESSION: Mild to moderate right apical pneumothorax. The left lung  appears clear. No pleural effusions appreciated. Mild cardiomegaly.    KURT ESPARZA MD   Chest  XR, 1 view PORTABLE    Narrative    CHEST ONE VIEW PORTABLE  8/11/2018 9:21 AM     HISTORY:  Chest tube placement.      COMPARISON: Earlier today.    FINDINGS: Mild elevation of the left hemidiaphragm. Superior  translation of the left humeral head, presumably related to rotator  cuff pathology.      Impression    IMPRESSION:  Placement of a right chest tube. Decreased right  pneumothorax, currently small.    JONG MARRERO MD   XR Chest 2 Views    Narrative    XR CHEST 2 VW 8/12/2018 7:05 AM    HISTORY: Rib fractures.    COMPARISON: August 11, 2018.      Impression    IMPRESSION: Right chest tube in place as before, with bibasilar  atelectasis, left greater than right. The  previously noted right  pneumothorax is not well appreciated on today's examination. Stable  mild cardiomegaly.    KURT ESPARZA MD   XR Chest 2 Views    Narrative    XR CHEST 2 VW 8/12/2018 11:23 AM    HISTORY: Pneumothorax.    COMPARISON: August 12, 2018.      Impression    IMPRESSION: Interval removal of right chest tube. There is a small  right apical pneumothorax measuring approximately 1.3 cm to the lung  apex. Streaky atelectasis or scarring of the left lung base persists,  with likely small left effusion.    KURT ESPARZA MD

## 2018-08-15 ENCOUNTER — APPOINTMENT (OUTPATIENT)
Dept: PHYSICAL THERAPY | Facility: CLINIC | Age: 83
DRG: 200 | End: 2018-08-15
Payer: MEDICARE

## 2018-08-15 VITALS
SYSTOLIC BLOOD PRESSURE: 123 MMHG | OXYGEN SATURATION: 95 % | TEMPERATURE: 97.7 F | WEIGHT: 178.1 LBS | RESPIRATION RATE: 18 BRPM | DIASTOLIC BLOOD PRESSURE: 51 MMHG | HEART RATE: 80 BPM | BODY MASS INDEX: 30.4 KG/M2 | HEIGHT: 64 IN

## 2018-08-15 PROCEDURE — 40000193 ZZH STATISTIC PT WARD VISIT: Performed by: PHYSICAL THERAPY ASSISTANT

## 2018-08-15 PROCEDURE — A9270 NON-COVERED ITEM OR SERVICE: HCPCS | Mod: GY | Performed by: INTERNAL MEDICINE

## 2018-08-15 PROCEDURE — A9270 NON-COVERED ITEM OR SERVICE: HCPCS | Mod: GY | Performed by: SURGERY

## 2018-08-15 PROCEDURE — 97116 GAIT TRAINING THERAPY: CPT | Mod: GP | Performed by: PHYSICAL THERAPY ASSISTANT

## 2018-08-15 PROCEDURE — 99231 SBSQ HOSP IP/OBS SF/LOW 25: CPT | Performed by: PHYSICIAN ASSISTANT

## 2018-08-15 PROCEDURE — 25000132 ZZH RX MED GY IP 250 OP 250 PS 637: Mod: GY | Performed by: INTERNAL MEDICINE

## 2018-08-15 PROCEDURE — 97530 THERAPEUTIC ACTIVITIES: CPT | Mod: GP | Performed by: PHYSICAL THERAPY ASSISTANT

## 2018-08-15 PROCEDURE — 25000132 ZZH RX MED GY IP 250 OP 250 PS 637: Mod: GY | Performed by: SURGERY

## 2018-08-15 PROCEDURE — 99232 SBSQ HOSP IP/OBS MODERATE 35: CPT | Performed by: INTERNAL MEDICINE

## 2018-08-15 RX ORDER — LOSARTAN POTASSIUM 50 MG/1
50 TABLET ORAL DAILY
Qty: 30 TABLET | Refills: 0 | Status: SHIPPED | OUTPATIENT
Start: 2018-08-16

## 2018-08-15 RX ORDER — VALACYCLOVIR HYDROCHLORIDE 1 G/1
1000 TABLET, FILM COATED ORAL 2 TIMES DAILY
Qty: 8 TABLET | Refills: 0 | Status: SHIPPED | OUTPATIENT
Start: 2018-08-15 | End: 2018-08-19

## 2018-08-15 RX ADMIN — SENNOSIDES AND DOCUSATE SODIUM 1 TABLET: 8.6; 5 TABLET ORAL at 08:59

## 2018-08-15 RX ADMIN — ACETAMINOPHEN 650 MG: 325 TABLET, FILM COATED ORAL at 00:49

## 2018-08-15 RX ADMIN — SERTRALINE HYDROCHLORIDE 100 MG: 100 TABLET ORAL at 08:59

## 2018-08-15 RX ADMIN — OXYCODONE HYDROCHLORIDE 5 MG: 5 TABLET ORAL at 00:49

## 2018-08-15 RX ADMIN — LEVOTHYROXINE SODIUM 75 MCG: 75 TABLET ORAL at 08:59

## 2018-08-15 RX ADMIN — VALACYCLOVIR HYDROCHLORIDE 1000 MG: 1 TABLET, FILM COATED ORAL at 08:59

## 2018-08-15 RX ADMIN — POLYETHYLENE GLYCOL 3350 17 G: 17 POWDER, FOR SOLUTION ORAL at 08:58

## 2018-08-15 RX ADMIN — LOSARTAN POTASSIUM 50 MG: 50 TABLET ORAL at 09:00

## 2018-08-15 ASSESSMENT — ACTIVITIES OF DAILY LIVING (ADL)
ADLS_ACUITY_SCORE: 19
ADLS_ACUITY_SCORE: 21
ADLS_ACUITY_SCORE: 19
ADLS_ACUITY_SCORE: 19

## 2018-08-15 NOTE — PLAN OF CARE
Problem: Patient Care Overview  Goal: Plan of Care/Patient Progress Review  Outcome: Improving  Patient hospitalized for multiple rib fractures. Alert and disoriented x3. No complaints of pain. LS diminished. Not requiring oxygen. Getting up multiple times to walk to the bathroom. VPM in place. Assist of 1.

## 2018-08-15 NOTE — PROGRESS NOTES
"[facility]  General Surgery Progress Note           Assessment and Plan:   Assessment:   Trauma admission  Acute traumatic injuries: non displaced fractures of right posterior 4th and 5th ribs with right pneumothorax  Dementia  Afebrile      Plan:   DC back to home [facility] with home health aide and PT/OT  Rx Senokot BID  IM to complete remainder of med rec         Interval History:   Comfortable in chair. Reports pain only when coughing. Denies shortness of breath or difficulty breathing. Has been up walking with PT/OT. Tolerating regular diet.         Physical Exam:   Blood pressure 136/61, pulse 80, temperature 97.7  F (36.5  C), temperature source Oral, resp. rate 18, height 1.626 m (5' 4\"), weight 80.8 kg (178 lb 1.6 oz), SpO2 96 %.    I/O last 3 completed shifts:  In: -   Out: 900 [Urine:900]    General: alert, cooperative, pleasant  HEENT: head normocephalic without ecchymosis  Chest: clear to auscultation, heart RRR, Without crepitus or swelling, no ecchymosis or deformity. Dressing at previous chest tube site in place on right side.  Abdomen: soft, nondistended, non tender, without masses, +BS          Data:     Chest Xray 8/12/18: IMPRESSION: Interval removal of right chest tube. There is a small  right apical pneumothorax measuring approximately 1.3 cm to the lung  apex. Streaky atelectasis or scarring of the left lung base persists,  with likely small left effusion.      Recent Labs  Lab 08/13/18  0714 08/11/18  0753   WBC 7.6 13.4*   HGB 11.3* 13.5   HCT 35.6 42.4   * 105*    265       Recent Labs  Lab 08/11/18  0753      POTASSIUM 4.1   CHLORIDE 103   CO2 26   ANIONGAP 6   *   BUN 21   CR 0.96   GFRESTIMATED 54*   GFRESTBLACK 65   KEVIN 9.0       Flory Carbajal PA-C     The patient has been seen and examined by me.  I agree with the above assessment and plan.  Debi Medel MD      "

## 2018-08-15 NOTE — DISCHARGE SUMMARY
Admit Date:     08/11/2018   Discharge Date:     08/15/2018      DATE OF ADMISSION:  08/11/2018.        DATE OF DISCHARGE:  08/15/2018.        PRIMARY CARE PHYSICIAN:  Ceasar Churchill MD.      DISCHARGE DIAGNOSES:   1. Traumatic pneumothorax secondary to right fourth and fifth rib fracture.   2. Mechanical fall resulting in rib fracture.   3. Hypertension.   4. Dementia.   5. Herpes zoster.      DISPOSITION:  To assisted living facility with home health care.      DISCHARGE CONDITION:  Stable.      DISCHARGE MEDICATIONS:  Reviewed and reconciled as in electronic medical record.      Review of your medicines      START taking       Dose / Directions    losartan 50 MG tablet   Commonly known as:  COZAAR   Used for:  Essential hypertension        Dose:  50 mg   Start taking on:  8/16/2018   Take 1 tablet (50 mg) by mouth daily   Quantity:  30 tablet   Refills:  0       valACYclovir 1000 mg tablet   Commonly known as:  VALTREX   Indication:  Shingles   Used for:  Herpes zoster dermatitis        Dose:  1000 mg   Take 1 tablet (1,000 mg) by mouth 2 times daily for 4 days   Quantity:  8 tablet   Refills:  0         CONTINUE these medicines which have NOT CHANGED       Dose / Directions    acetaminophen 325 MG tablet   Commonly known as:  TYLENOL        Dose:  650 mg   Take 650 mg by mouth 2 times daily as needed for mild pain   Refills:  0       conjugated estrogens cream   Commonly known as:  PREMARIN        Place vaginally as needed   Refills:  0       Dextromethorphan-guaiFENesin  MG/5ML syrup        Dose:  10 mL   Take 10 mLs by mouth every 4 hours as needed for cough   Refills:  0       mineral oil-white petrolatum Crea cream        Apply topically 2 times daily , apply to lower legs until resolved for dry skin   Refills:  0       PATADAY 0.2 % Soln   Generic drug:  olopatadine HCl        Dose:  1 drop   Place 1 drop into both eyes as needed   Refills:  0       * polyethylene glycol Packet   Commonly known as:   MIRALAX/GLYCOLAX        Dose:  1 packet   Take 1 packet by mouth daily   Refills:  0       * polyethylene glycol Packet   Commonly known as:  MIRALAX/GLYCOLAX        Dose:  1 packet   Take 1 packet by mouth as needed for constipation   Refills:  0       RA BUNION CUSHION Pads        Apply topically as needed   Refills:  0       REFRESH 1 % ophthalmic solution   Generic drug:  carboxymethylcellulose        Dose:  3 drop   Place 3 drops into both eyes 2 times daily as needed for dry eyes   Refills:  0       SYNTHROID 75 MCG tablet   Generic drug:  levothyroxine        Dose:  75 mcg   Take 75 mcg by mouth daily   Refills:  0       triamcinolone 0.1 % cream   Commonly known as:  KENALOG        Apply topically 2 times daily as needed for irritation , apply to rash on face, legs & back   Refills:  0       vitamin D 2000 units tablet        Dose:  1 tablet   Take 1 tablet by mouth daily   Refills:  0       ZOLOFT 100 MG tablet   Generic drug:  sertraline        Dose:  100 mg   Take 100 mg by mouth daily   Refills:  0       * Notice:  This list has 2 medication(s) that are the same as other medications prescribed for you. Read the directions carefully, and ask your doctor or other care provider to review them with you.         Where to get your medicines      These medications were sent to [facility] [address] [facility]  [address] [facility] [address]     Phone:  [phone]      losartan 50 MG tablet     valACYclovir 1000 mg tablet             PROCEDURES DONE:  During this admission is a chest tube placement.      CONSULTATIONS:  From General Surgery for trauma, Thoracic Surgery for chest tube.      DISCHARGE DIET:  Regular.      DISCHARGE ACTIVITY:  As tolerated.      DISCHARGE FOLLOWUP:  Primary care provider in 1 week.  The patient will have basic metabolic panel done also in 1 week.      DISCHARGE PHYSICAL EXAMINATION:   GENERAL:  Awake, alert, oriented to place,  not to time.   DISCHARGE VITAL SIGNS:  Blood pressure 120/51, pulse rate 80, temperature 97, oxygen saturation 95%.   HEENT:  Pink, nonicteric.  Extraocular muscle movement intact.   NECK:  Supple, no JVD, no thyromegaly.   CHEST:  Good air entry bilaterally.  No wheezing, crackles or rales.   CARDIOVASCULAR:  S1 and S2 well heard.  No gallop or murmur.   ABDOMEN:  Soft, nontender, nondistended, positive bowel sounds, no organomegaly.   EXTREMITIES:  No edema, cyanosis or clubbing.   BACK:  There is a rash with small blisters and erythematous base on the S2 distribution.      BRIEF HISTORY AND HOSPITAL COURSE:  Coty Trotter is a 89-year-old female who lives in an assisted living facility who fell down while going to bed and sustained trauma to the right chest and found to have rib fracture and pneumothorax to the right side, admitted on 2018 with a wrist fracture and traumatic pneumothorax.  She had chest tube placed and monitored by Thoracic Surgery.  Repeated chest x-ray showed resolution of the pneumothorax.  Chest tube was placed.  The patient remained stable respiratory wise.  PT, OT worked with the patient.  Initial plan was to consider to transfer her to transitional care unit, but she is at her baseline status, continued to be confused and issues with her memory due to underlying dementia and discharged back to assisted living facility where she gets help with home health care.  Patient does not appear to have any questions and discharged.      Total time spent coordinating her discharge was over 30 minutes.         CHER FUCHS MD             D: 08/15/2018   T: 08/15/2018   MT: ROSALINDA      Name:     COTY TROTTER   MRN:      [id number]        Account:        [id number]   :      1921           Admit Date:     2018                                  Discharge Date: 08/15/2018      Document: [id number]

## 2018-08-15 NOTE — PROGRESS NOTES
Ok to discharge from Thoracic surgery perspective. Will set up f/u in clinic with CXR in 2 weeks. I will contact our coordinators to call patient to schedule f/u. Discussed with Dr Johnnie Jacome.           Brianna Young PA-C  Thoracic Surgery  [facility]

## 2018-08-15 NOTE — PLAN OF CARE
Problem: Patient Care Overview  Goal: Plan of Care/Patient Progress Review  Outcome: Improving  Ambulatory Status:  Pt up Ax1 with walker and GB.   Orientation: Alert to self only   VS: afebrile, B/P 197/67,172/68  Pain: c/o back pain, Oxycodone given x1, Tylenol given x1  Resp: LS clear, dim,Sats 95% RA   GI:  Denies nausea. Regular diet.  BS +.  Passing flatus.  Last BM 8/14  : voided sm amt, bladder scanned for 709, straight cathed for 850 clear elizabeth urine  Skin:  Bruising, rash on left lower back, left inner thigh, feet red  Tx: Valtrex, Pain control   Consults:  PT/OT/SW   Disposition:  TBD

## 2018-08-15 NOTE — DISCHARGE INSTRUCTIONS
HOME CARE FOLLOWING TRAUMA ADMISSION - NONSURGICAL FRACTURES/ABRASIONS  DANAY Hauser, EVERETTE Oneill, SANDRA Mathew    NEXT APPOINTMENT:  Follow-up with your primary care provider in 2-3 days for recheck of your injuries and overall medical status.  At that time you may address ongoing care recommendations and activity restrictions.    WOUND CARE:  Apply bacitracin to abrasions twice a day and cover sites with non-stick dressings afterwards.      ACTIVITY:  Light Activity -- you may immediately be up and about as tolerated.  Driving -- you may drive when comfortable and off narcotic pain medications.  Light Work -- resume when comfortable off pain medications.  (If you can drive, you probably can work.)  Strenuous Work/Activity -- limit lifting to 10 pounds for 2 weeks.  Restrictions after this time should be recommended by your primary care provider.    DISCOMFORT:  Use pain medications as prescribed by your surgeon.  Take the pain medication with some food, when possible, to minimize side effects.  Expect gradual improvement.    DIET:  Return to diet you were on before surgery.  Drink plenty of fluids.  While taking pain medications, increase dietary fiber or add a fiber supplementation like Metamucil or Citrucel to help prevent constipation - a possible side effect of pain medications.    Surgeon office contact number:  Office Phone:  [phone]      FREQUENTLY ASKED QUESTIONS:    Q:  What can I do to minimize constipation (very hard stools, or lack of stools)?  A:  Stay well hydrated.  Increase your dietary fiber intake or take a fiber supplement -with plenty of water.  Walk around frequently.  You may consider an over-the-counter stool-softener.  Your Pharmacist can assist you with choosing one that is stocked at your pharmacy.  Constipation is also one of the most common side effects of pain medication.  If you are using pain medication, be pro-active and try to PREVENT problems with  constipation by taking the steps above BEFORE constipation becomes a problem.    Q:  Why am I having a hard time sleeping now that I am at home?  A:  Many medications you receive while you are in the hospital can impact your sleep for a number of days after your surgery/hospitalization.  Decreased level of activity and naps during the day may also make sleeping at night difficult.  Try to minimize day-time naps, and get up frequently during the day to walk around your home during your recovery time.  Sleep aides may be of some help, but are not recommended for long-term use.            If you have other questions, please call the office Monday thru Friday between 8am and 5pm to discuss with the nurse or physician assistant.  #[phone]    There is a surgeon ON CALL on weekday evenings and over the weekend in case of urgent need only, and may be contacted at the same number.    If you are having an emergency, call 911 or proceed to your nearest emergency department.    CM: [facility] RN/PT/OT

## 2018-08-15 NOTE — PLAN OF CARE
Problem: Patient Care Overview  Goal: Plan of Care/Patient Progress Review  Outcome: Adequate for Discharge Date Met: 08/15/18  Pt to D/C to back to [facility] with RN/PT/OT care. Pt alert to self only. This writer left VM message for AL RN staff to verbally go through discharge orders. Packet sent with patient as well. Medications reviewed with patient. All questions were answered at this time.  Copy of paperwork sent with pt.  Medications x 2 sent with pt.  [facility]  All personal belongings sent with pt.

## 2018-08-15 NOTE — PROGRESS NOTES
Discharge Planner   Discharge Plans in progress: Spoke with LPN at [facility]. They are aware that pt will return today and they understands pt is being treated for Shingles  Barriers to discharge plan: [facility] would like new meds filled and sent with   Follow up plan:      08/13/18 1000   Final Resources   [facility]  ([facility] )   Home [facility] [phone], Fax: [phone]   kyrie faxed to [phone]  H/E transport [id number]       Entered by: Corinne C. White 08/15/2018 12:57 PM

## 2018-08-15 NOTE — PROGRESS NOTES
I updated dtg POA medical of dc planning.  She thought that pt dc'd yesterday.  I discussed medicare bill of rights with dtg. Madhavi FRASER CTS [id number]

## 2018-08-15 NOTE — PLAN OF CARE
Problem: Patient Care Overview  Goal: Plan of Care/Patient Progress Review  Discharge Planner PT   Patient plan for discharge: home  Current status: CGA to SBA for basic transfers and gait with RW to 125 feet poor turn quality and safety  Uses 4ww at home not able to fatmata this device here and doubt will be safety concern at home  Barriers to return to prior living situation: none  Recommendations for discharge:  Return to [facility] with Home PT,OT and HHA for safety evaluation, strengthening and assistance with bathing per plan established by the PT.     Rationale for recommendations: closer to her baseline with mobility but does not have 4ww as at home and this limits her safety with turns while using RW       Entered by: Wendy Bailon 08/15/2018 12:10 PM

## 2018-08-16 ENCOUNTER — NURSE TRIAGE (OUTPATIENT)
Dept: NURSING | Facility: CLINIC | Age: 83
End: 2018-08-16

## 2018-08-16 NOTE — PLAN OF CARE
Problem: Patient Care Overview  Goal: Plan of Care/Patient Progress Review  OT: Pt discharged prior to OT session, was not treated this date.     Occupational Therapy Discharge Summary    Reason for therapy discharge:    Discharged to home with home therapy.    Progress towards therapy goal(s). See goals on Care Plan in [facility] electronic health record for goal details.  Goals not met.  Barriers to achieving goals:   discharge from facility.    Therapy recommendation(s):    Continued therapy is recommended.  Rationale/Recommendations:  eval and treat at [facility] - return to [facility] with supervision/assist for all ADLs and HH OT;.

## 2018-08-16 NOTE — TELEPHONE ENCOUNTER
"Nurse from [facility] pt discharged yesterday from [facility] and was not discharged w/ any pain medication other than Tylenol. Pt c/o severe pain and Tylenol is not helping. Nurse asking for other pain med orders. Pt is not seen at  Clinic. Her PCP is from another health care system. Advised nurse to call PCP within 24 hrs (would call now since pt is in pain) to discuss pain control. AL nurse voiced understanding - she will call pt's pcp office now. Sherie Valerio RN/FNA     Reason for Disposition    Caller requesting a NON-URGENT new prescription or refill and triager unable to refill per unit policy    Additional Information    Negative: Drug overdose and nurse unable to answer question    Negative: Caller requesting information not related to medicine    Negative: Caller requesting a prescription for Strep throat and has a positive culture result    Negative: Rash while taking a medication or within 3 days of stopping it    Negative: Immunization reaction suspected    Negative: [1] Asthma and [2] having symptoms of asthma (cough, wheezing, etc)    Negative: MORE THAN A DOUBLE DOSE of a prescription or over-the-counter (OTC) drug    Negative: [1] DOUBLE DOSE (an extra dose or lesser amount) of over-the-counter (OTC) drug AND [2] any symptoms (e.g., dizziness, nausea, pain, sleepiness)    Negative: [1] DOUBLE DOSE (an extra dose or lesser amount) of prescription drug AND [2] any symptoms (e.g., dizziness, nausea, pain, sleepiness)    Negative: Took another person's prescription drug    Negative: [1] DOUBLE DOSE (an extra dose or lesser amount) of prescription drug AND [2] NO symptoms (Exception: a double dose of antibiotics)    Negative: Diabetes drug error or overdose (e.g., insulin or extra dose)    Negative: [1] Request for URGENT new prescription or refill of \"essential\" medication (i.e., likelihood of harm to patient if not taken) AND [2] triager unable to fill per unit policy    Negative: [1] " Prescription not at pharmacy AND [2] was prescribed today by PCP    Negative: Pharmacy calling with prescription questions and triager unable to answer question    Negative: Caller has URGENT medication question about med that PCP prescribed and triager unable to answer question    Negative: Caller has NON-URGENT medication question about med that PCP prescribed and triager unable to answer question    Protocols used: MEDICATION QUESTION CALL-ADULT-

## 2018-08-22 ENCOUNTER — RECORDS - HEALTHEAST (OUTPATIENT)
Dept: LAB | Facility: CLINIC | Age: 83
End: 2018-08-22

## 2018-08-22 LAB
ERYTHROCYTE [DISTWIDTH] IN BLOOD BY AUTOMATED COUNT: 12.9 % (ref 11–14.5)
HCT VFR BLD AUTO: 37.9 % (ref 35–47)
HGB BLD-MCNC: 12 G/DL (ref 12–16)
MCH RBC QN AUTO: 32.7 PG (ref 27–34)
MCHC RBC AUTO-ENTMCNC: 31.7 G/DL (ref 32–36)
MCV RBC AUTO: 103 FL (ref 80–100)
PLATELET # BLD AUTO: 328 THOU/UL (ref 140–440)
PMV BLD AUTO: 9.7 FL (ref 8.5–12.5)
RBC # BLD AUTO: 3.67 MILL/UL (ref 3.8–5.4)
WBC: 6.1 THOU/UL (ref 4–11)

## 2018-08-28 DIAGNOSIS — K30 INDIGESTION: Primary | ICD-10-CM

## 2018-09-14 ENCOUNTER — RECORDS - HEALTHEAST (OUTPATIENT)
Dept: LAB | Facility: CLINIC | Age: 83
End: 2018-09-14

## 2018-09-14 LAB
ALBUMIN UR-MCNC: ABNORMAL MG/DL
APPEARANCE UR: ABNORMAL
BACTERIA #/AREA URNS HPF: ABNORMAL HPF
BILIRUB UR QL STRIP: NEGATIVE
COLOR UR AUTO: YELLOW
GLUCOSE UR STRIP-MCNC: NEGATIVE MG/DL
HGB UR QL STRIP: ABNORMAL
KETONES UR STRIP-MCNC: NEGATIVE MG/DL
LEUKOCYTE ESTERASE UR QL STRIP: ABNORMAL
NITRATE UR QL: NEGATIVE
PH UR STRIP: 6 [PH] (ref 4.5–8)
RBC #/AREA URNS AUTO: ABNORMAL HPF
SP GR UR STRIP: 1.03 (ref 1–1.03)
SQUAMOUS #/AREA URNS AUTO: ABNORMAL LPF
UROBILINOGEN UR STRIP-ACNC: ABNORMAL
WBC #/AREA URNS AUTO: >100 HPF

## 2018-09-15 LAB — BACTERIA SPEC CULT: NORMAL

## 2018-10-10 ENCOUNTER — RECORDS - HEALTHEAST (OUTPATIENT)
Dept: LAB | Facility: CLINIC | Age: 83
End: 2018-10-10

## 2018-10-10 LAB
ANION GAP SERPL CALCULATED.3IONS-SCNC: 5 MMOL/L (ref 5–18)
BUN SERPL-MCNC: 12 MG/DL (ref 8–28)
CALCIUM SERPL-MCNC: 9.8 MG/DL (ref 8.5–10.5)
CHLORIDE BLD-SCNC: 102 MMOL/L (ref 98–107)
CO2 SERPL-SCNC: 31 MMOL/L (ref 22–31)
CREAT SERPL-MCNC: 0.93 MG/DL (ref 0.6–1.1)
GFR SERPL CREATININE-BSD FRML MDRD: 56 ML/MIN/1.73M2
GLUCOSE BLD-MCNC: 96 MG/DL (ref 70–125)
POTASSIUM BLD-SCNC: 4.7 MMOL/L (ref 3.5–5)
SODIUM SERPL-SCNC: 138 MMOL/L (ref 136–145)

## 2018-11-12 ENCOUNTER — RECORDS - HEALTHEAST (OUTPATIENT)
Dept: LAB | Facility: CLINIC | Age: 83
End: 2018-11-12

## 2018-11-12 LAB
ALBUMIN UR-MCNC: ABNORMAL MG/DL
APPEARANCE UR: ABNORMAL
BACTERIA #/AREA URNS HPF: ABNORMAL HPF
BILIRUB UR QL STRIP: NEGATIVE
COLOR UR AUTO: ABNORMAL
GLUCOSE UR STRIP-MCNC: NEGATIVE MG/DL
HGB UR QL STRIP: ABNORMAL
KETONES UR STRIP-MCNC: NEGATIVE MG/DL
LEUKOCYTE ESTERASE UR QL STRIP: ABNORMAL
NITRATE UR QL: NEGATIVE
PH UR STRIP: 7.5 [PH] (ref 4.5–8)
RBC #/AREA URNS AUTO: ABNORMAL HPF
SP GR UR STRIP: 1.02 (ref 1–1.03)
SQUAMOUS #/AREA URNS AUTO: ABNORMAL LPF
UROBILINOGEN UR STRIP-ACNC: ABNORMAL
WBC #/AREA URNS AUTO: >100 HPF

## 2018-11-14 LAB — BACTERIA SPEC CULT: NORMAL

## 2018-12-09 ENCOUNTER — HOSPITAL ENCOUNTER (EMERGENCY)
Facility: CLINIC | Age: 83
Discharge: HOME | End: 2018-12-09
Attending: EMERGENCY MEDICINE | Admitting: EMERGENCY MEDICINE
Payer: MEDICARE

## 2018-12-09 ENCOUNTER — APPOINTMENT (OUTPATIENT)
Dept: CT IMAGING | Facility: CLINIC | Age: 83
End: 2018-12-09
Attending: EMERGENCY MEDICINE
Payer: MEDICARE

## 2018-12-09 VITALS
OXYGEN SATURATION: 95 % | RESPIRATION RATE: 16 BRPM | DIASTOLIC BLOOD PRESSURE: 78 MMHG | SYSTOLIC BLOOD PRESSURE: 171 MMHG | HEART RATE: 76 BPM | TEMPERATURE: 96.9 F

## 2018-12-09 DIAGNOSIS — S09.90XA CLOSED HEAD INJURY, INITIAL ENCOUNTER: ICD-10-CM

## 2018-12-09 DIAGNOSIS — N39.0 ACUTE UTI: ICD-10-CM
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
## 2018-12-09 LAB
ALBUMIN UR-MCNC: NEGATIVE MG/DL
ANION GAP SERPL CALCULATED.3IONS-SCNC: 6 MMOL/L (ref 3–14)
APPEARANCE UR: ABNORMAL
BACTERIA #/AREA URNS HPF: ABNORMAL /HPF
BASOPHILS # BLD AUTO: 0 10E9/L (ref 0–0.2)
BASOPHILS NFR BLD AUTO: 0.6 %
BILIRUB UR QL STRIP: NEGATIVE
BUN SERPL-MCNC: 12 MG/DL (ref 7–30)
CALCIUM SERPL-MCNC: 8.9 MG/DL (ref 8.5–10.1)
CHLORIDE SERPL-SCNC: 104 MMOL/L (ref 94–109)
CO2 SERPL-SCNC: 30 MMOL/L (ref 20–32)
COLOR UR AUTO: YELLOW
CREAT SERPL-MCNC: 1.01 MG/DL (ref 0.52–1.04)
DIFFERENTIAL METHOD BLD: ABNORMAL
EOSINOPHIL # BLD AUTO: 0.1 10E9/L (ref 0–0.7)
EOSINOPHIL NFR BLD AUTO: 1.9 %
ERYTHROCYTE [DISTWIDTH] IN BLOOD BY AUTOMATED COUNT: 12.8 % (ref 10–15)
GFR SERPL CREATININE-BSD FRML MDRD: 51 ML/MIN/1.7M2
GLUCOSE SERPL-MCNC: 103 MG/DL (ref 70–99)
GLUCOSE UR STRIP-MCNC: NEGATIVE MG/DL
HCT VFR BLD AUTO: 39 % (ref 35–47)
HGB BLD-MCNC: 12.3 G/DL (ref 11.7–15.7)
HGB UR QL STRIP: ABNORMAL
IMM GRANULOCYTES # BLD: 0.1 10E9/L (ref 0–0.4)
IMM GRANULOCYTES NFR BLD: 1 %
KETONES UR STRIP-MCNC: NEGATIVE MG/DL
LEUKOCYTE ESTERASE UR QL STRIP: ABNORMAL
LYMPHOCYTES # BLD AUTO: 0.8 10E9/L (ref 0.8–5.3)
LYMPHOCYTES NFR BLD AUTO: 12.7 %
MCH RBC QN AUTO: 33.2 PG (ref 26.5–33)
MCHC RBC AUTO-ENTMCNC: 31.5 G/DL (ref 31.5–36.5)
MCV RBC AUTO: 105 FL (ref 78–100)
MONOCYTES # BLD AUTO: 0.5 10E9/L (ref 0–1.3)
MONOCYTES NFR BLD AUTO: 8.2 %
MUCOUS THREADS #/AREA URNS LPF: PRESENT /LPF
NEUTROPHILS # BLD AUTO: 4.7 10E9/L (ref 1.6–8.3)
NEUTROPHILS NFR BLD AUTO: 75.6 %
NITRATE UR QL: NEGATIVE
NRBC # BLD AUTO: 0 10*3/UL
NRBC BLD AUTO-RTO: 0 /100
NT-PROBNP SERPL-MCNC: 483 PG/ML (ref 0–1800)
PH UR STRIP: 6 PH (ref 5–7)
PLATELET # BLD AUTO: 262 10E9/L (ref 150–450)
POTASSIUM SERPL-SCNC: 3.9 MMOL/L (ref 3.4–5.3)
RBC # BLD AUTO: 3.7 10E12/L (ref 3.8–5.2)
RBC #/AREA URNS AUTO: 9 /HPF (ref 0–2)
SODIUM SERPL-SCNC: 140 MMOL/L (ref 133–144)
SOURCE: ABNORMAL
SP GR UR STRIP: 1.01 (ref 1–1.03)
SQUAMOUS #/AREA URNS AUTO: 3 /HPF (ref 0–1)
TROPONIN I SERPL-MCNC: <0.015 UG/L (ref 0–0.04)
UROBILINOGEN UR STRIP-MCNC: 0 MG/DL (ref 0–2)
WBC # BLD AUTO: 6.2 10E9/L (ref 4–11)
WBC #/AREA URNS AUTO: 56 /HPF (ref 0–5)

## 2018-12-09 PROCEDURE — 87186 SC STD MICRODIL/AGAR DIL: CPT | Performed by: EMERGENCY MEDICINE

## 2018-12-09 PROCEDURE — 83880 ASSAY OF NATRIURETIC PEPTIDE: CPT | Performed by: EMERGENCY MEDICINE

## 2018-12-09 PROCEDURE — 72125 CT NECK SPINE W/O DYE: CPT

## 2018-12-09 PROCEDURE — 85025 COMPLETE CBC W/AUTO DIFF WBC: CPT | Performed by: EMERGENCY MEDICINE

## 2018-12-09 PROCEDURE — 84484 ASSAY OF TROPONIN QUANT: CPT | Performed by: EMERGENCY MEDICINE

## 2018-12-09 PROCEDURE — 80048 BASIC METABOLIC PNL TOTAL CA: CPT | Performed by: EMERGENCY MEDICINE

## 2018-12-09 PROCEDURE — 99285 EMERGENCY DEPT VISIT HI MDM: CPT | Mod: 25

## 2018-12-09 PROCEDURE — 93005 ELECTROCARDIOGRAM TRACING: CPT

## 2018-12-09 PROCEDURE — 81001 URINALYSIS AUTO W/SCOPE: CPT | Performed by: EMERGENCY MEDICINE

## 2018-12-09 PROCEDURE — 70450 CT HEAD/BRAIN W/O DYE: CPT

## 2018-12-09 PROCEDURE — 87086 URINE CULTURE/COLONY COUNT: CPT | Performed by: EMERGENCY MEDICINE

## 2018-12-09 PROCEDURE — A9270 NON-COVERED ITEM OR SERVICE: HCPCS | Mod: GY | Performed by: EMERGENCY MEDICINE

## 2018-12-09 PROCEDURE — 25000132 ZZH RX MED GY IP 250 OP 250 PS 637: Mod: GY | Performed by: EMERGENCY MEDICINE

## 2018-12-09 PROCEDURE — 87088 URINE BACTERIA CULTURE: CPT | Performed by: EMERGENCY MEDICINE

## 2018-12-09 RX ORDER — CEPHALEXIN 500 MG/1
500 CAPSULE ORAL 2 TIMES DAILY
Qty: 14 CAPSULE | Refills: 0 | Status: SHIPPED | OUTPATIENT
Start: 2018-12-09 | End: 2018-12-16

## 2018-12-09 RX ORDER — CEPHALEXIN 500 MG/1
500 CAPSULE ORAL ONCE
Status: COMPLETED | OUTPATIENT
Start: 2018-12-09 | End: 2018-12-09

## 2018-12-09 RX ADMIN — CEPHALEXIN 500 MG: 500 CAPSULE ORAL at 14:59

## 2018-12-09 NOTE — ED NOTES
Bed: ED20  Expected date: 12/9/18  Expected time: 8:58 AM  Means of arrival: Ambulance  Comments:  [id number]

## 2018-12-09 NOTE — ED TRIAGE NOTES
Patient presents to the ED following a fall. Per report, found on the ground this morning at memory care unit. Unsure how long patient was on the ground, but noted patient was already dressed for the day. Patient has dementia and had no memory of fall. Complaining of left posterior head pain. Hypertensive for EMS, but facility reported patient has not had morning meds.

## 2018-12-09 NOTE — ED AVS SNAPSHOT
[facility] Emergency Department  [address]  [facility] [address]  Phone:  [phone]  Fax:  [phone]                                    Coty Trotter   MRN: [id number]    Department:  [facility] Emergency Department   Date of Visit:  12/9/2018           After Visit Summary Signature Page    I have received my discharge instructions, and my questions have been answered. I have discussed any challenges I see with this plan with the nurse or doctor.    ..........................................................................................................................................  Patient/Patient Representative Signature      ..........................................................................................................................................  Patient Representative Print Name and Relationship to Patient    ..................................................               ................................................  Date                                   Time    ..........................................................................................................................................  Reviewed by Signature/Title    ...................................................              ..............................................  Date                                               Time          22EPIC Rev 08/18

## 2018-12-09 NOTE — ED PROVIDER NOTES
History     Chief Complaint:  Fall    The history is provided by the patient and the EMS personnel. The history is limited by the condition of the patient.      Coty Trotter is a 89 year old female with a past medical history significant for dementia and hypertension who presents via EMS from her memory care unit for evaluation in the setting of an unwitnessed fall. Earlier this morning, the patient was reportedly found, fully dressed, on the floor of her apartment. She notes pain in the back side of her head but denies any other symptoms. There is no known loss of consciousness and the patient is unable to recall the details of this event. She presents with a notable contusion to the left side of her head, although she denies any neck or back pain in the ED. No abdominal pain or changes in urination reported. While in the ED the patient states that she is having a hard time breathing, but this is improved after the patient is in an upright position. Her history is otherwise limited due to the patients mental status.     Allergies:  Sulfa Drugs    Medications:    Synthroid  Cozaar  Zoloft  Valtrex    Past Medical History:    Pneumothorax  Hypertension  GERD  Dementia     Past Surgical History:    History reviewed. No pertinent surgical history.    Family History:    History reviewed. No pertinent family history.     Social History:  The patient was accompanied to the ED by EMS.  Marital Status:  Single [1]    Review of Systems   Cardiovascular: Negative for chest pain.   Gastrointestinal: Negative for abdominal pain, diarrhea, nausea and vomiting.   Musculoskeletal: Negative for back pain and neck pain.   Skin: Negative for wound.   Neurological: Positive for headaches. Negative for dizziness.     ROS limited due to patient's Dementia     Physical Exam     Patient Vitals for the past 24 hrs:   BP Temp Pulse Resp SpO2   12/09/18 1500 -- -- 76 16 95 %   12/09/18 1155 -- -- -- -- 94 %   12/09/18 1150 -- -- --  -- 97 %   12/09/18 1145 -- -- -- -- 98 %   12/09/18 1140 -- -- -- -- 98 %   12/09/18 1135 -- -- -- -- 98 %   12/09/18 1130 171/78 -- -- -- 96 %   12/09/18 1100 (!) 209/95 -- -- -- 99 %   12/09/18 1045 -- -- -- -- 98 %   12/09/18 1030 181/70 -- -- -- 96 %   12/09/18 1015 -- -- -- -- 94 %   12/09/18 1000 180/72 -- -- -- 94 %   12/09/18 0945 (!) 201/78 -- -- -- 99 %   12/09/18 0930 -- -- -- -- 96 %   12/09/18 0915 -- -- -- -- 95 %   12/09/18 0914 189/89 96.9  F (36.1  C) 75 24 97 %     Physical Exam  General: Eldely female sitting upright  Eyes: PERRL, Conjunctive within normal limits  HENT: Tender scalp swelling over the left posterior parietal region. No palpable skull deformity. Moist mucous membranes, oropharynx clear.   CV: Normal S1S2, no murmur, rub or gallop. Regular rate and rhythm  Resp: Clear to auscultation bilaterally, no wheezes, rales or rhonchi. Normal respiratory effort.  GI: Abdomen is soft, nontender and nondistended. No palpable masses. No rebound or guarding.  MSK: Bilateral lower extremity edema. Nontender. Normal active range of motion.  Skin: Warm and dry. Chronic appearing skin erythema bilateral lower legs. No rashes or lesions or ecchymoses on visible skin.  Neuro: Alert and oriented. Responds appropriately to all questions and commands. No focal findings appreciated. Normal muscle tone.  Psych: Normal mood and affect. Pleasant.  Emergency Department Course     ECG (09:42:29):  Rate 64 bpm. AK interval 214. QRS duration 142. QT/QTc 418/431. P-R-T axes 27 78 52. Sinus rhythm with 1st degree AV block with premature supraventricular complexes. Right bundle branch block. Interpreted at 0945 by Daylin Estes MD.    Imaging:  Radiology findings were communicated with the patient who voiced understanding of the findings.    Cervical Spine CT w/o Contrast:  1. Degenerative changes.  2. No evidence of acute trauma.  3. Moderate spinal canal stenosis at C5-C6 and C6-C7.  4. Acquired fusion at  C2-C3.  5. No change.    BRETT BRIGGS MD    CT Head w/o Contrast:  IMPRESSION:   1. Left parietal scalp soft tissue swelling with no underlying  fracture. No evidence of intracranial trauma.  2. Atrophy of the brain. White matter changes consistent with sequelae  of small vessel ischemic disease. This is unchanged.      BRETT BRIGGS MD    Laboratory:  Laboratory findings were communicated with the patient who voiced understanding of the findings.    CBC: WNL (WBC 6.2, HGB 12.3, )  BMP: Glucose 103 (H), GFR 51 (L), o/w WNL (Creatinine 1.01)    Troponin (Collected 0941): <0.015    BNP: 483    UA: Blood small, leukocyte esterase moderate, WBC 56, RBC 9, bacteria many, Squamous epithelial 3, mucous present, o/w Negative    Inventions:  1459 - Keflex capsule 500 mg PO     Emergency Department Course:  Nursing notes and vitals reviewed.    0920: I performed an exam of the patient as documented above.     1158: Patient rechecked and updated.     Findings and plan explained to the Patient. Patient discharged home with instructions regarding supportive care, medications, and reasons to return. The importance of close follow-up was reviewed.     Impression & Plan      Medical Decision Making:  Coty Trotter is a 89 year old female who has a history of dementia and lives in a memory care unit who presents to the ED with fall which she does not remember. She was found on the ground by staff members fully clothed in the morning suggesting that she had gotten up and gotten dressed and then fallen down. It is not clear as to the etiology of the fall. Fortunately here there is no concerning findings for acute coronary syndrome, anemia, electrolyte abnormality or other acute life threatening pathology. She did have a head injury on examination and given concerns for possible neck involvement, head CT and neck CT were obtained. Fortunately these did not show any acute symptomology. She is cleared form her collar by  NEXUS criteria she is ambulatory here without assistance and reports she walks baseline without a walker. I would recommend a walker at the nursing facility. She has evidence of a UTI but does not report dysuria. Given her dementia and strong suggestion of UTI a urine culture was sent and she was given Keflex. She felt comfortable with this plan and was appropriate for discharge home at this time. Discharged by wheelchair van back to the nursing facility.     Diagnosis:    ICD-10-CM    1. Acute UTI N39.0 Urine Culture Aerobic Bacterial   2. Closed head injury, initial encounter S09.90XA    3. Fall, initial encounter W19.XXXA        Disposition:  discharged to home    Discharge Medications:     Review of your medicines      UNREVIEWED medicines. Ask your doctor about these medicines      Dose / Directions   acetaminophen 325 MG tablet  Commonly known as:  TYLENOL      Dose:  650 mg  Take 650 mg by mouth 2 times daily as needed for mild pain  Refills:  0     conjugated estrogens 0.625 MG/GM vaginal cream  Commonly known as:  PREMARIN      Place vaginally as needed  Refills:  0     Dextromethorphan-guaiFENesin  MG/5ML syrup      Dose:  10 mL  Take 10 mLs by mouth every 4 hours as needed for cough  Refills:  0     losartan 50 MG tablet  Commonly known as:  COZAAR  Used for:  Essential hypertension      Dose:  50 mg  Take 1 tablet (50 mg) by mouth daily  Quantity:  30 tablet  Refills:  0     mineral oil-white petrolatum Crea cream      Apply topically 2 times daily , apply to lower legs until resolved for dry skin  Refills:  0     PATADAY 0.2 % ophthalmic solution  Generic drug:  olopatadine      Dose:  1 drop  Place 1 drop into both eyes as needed  Refills:  0     * polyethylene glycol packet  Commonly known as:  MIRALAX/GLYCOLAX      Dose:  1 packet  Take 1 packet by mouth daily  Refills:  0     * polyethylene glycol packet  Commonly known as:  MIRALAX/GLYCOLAX      Dose:  1 packet  Take 1 packet by mouth as  needed for constipation  Refills:  0     REFRESH 1 % ophthalmic solution  Generic drug:  carboxymethylcellulose      Dose:  3 drop  Place 3 drops into both eyes 2 times daily as needed for dry eyes  Refills:  0     SYNTHROID 75 MCG tablet  Generic drug:  levothyroxine      Dose:  75 mcg  Take 75 mcg by mouth daily  Refills:  0     triamcinolone 0.1 % external cream  Commonly known as:  KENALOG      Apply topically 2 times daily as needed for irritation , apply to rash on face, legs & back  Refills:  0     valACYclovir 1000 mg tablet  Commonly known as:  VALTREX  Indication:  Shingles  Used for:  Herpes zoster dermatitis      Dose:  1000 mg  Take 1 tablet (1,000 mg) by mouth 2 times daily for 4 days  Quantity:  8 tablet  Refills:  0     vitamin D3 2000 units tablet  Commonly known as:  CHOLECALCIFEROL      Dose:  1 tablet  Take 1 tablet by mouth daily  Refills:  0     ZOLOFT 100 MG tablet  Generic drug:  sertraline      Dose:  100 mg  Take 100 mg by mouth daily  Refills:  0         * This list has 2 medication(s) that are the same as other medications prescribed for you. Read the directions carefully, and ask your doctor or other care provider to review them with you.            START taking      Dose / Directions   cephALEXin 500 MG capsule  Commonly known as:  KEFLEX      Dose:  500 mg  Take 1 capsule (500 mg) by mouth 2 times daily for 7 days  Quantity:  14 capsule  Refills:  0        CONTINUE these medicines which have NOT CHANGED      Dose / Directions   RA BUNION CUSHION Pads      Apply topically as needed  Refills:  0           Where to get your medicines      Some of these will need a paper prescription and others can be bought over the counter. Ask your nurse if you have questions.    Bring a paper prescription for each of these medications    cephALEXin 500 MG capsule           Daylin Estes  12/9/2018   [facility] EMERGENCY DEPARTMENT  I, Puja Vasquez, hannah serving as a scribe at 9:20 AM  on 12/9/2018 to document services personally performed by Daylin Estes MD based on my observations and the provider's statements to me.         Daylin Estes MD  12/12/18 2045

## 2018-12-09 NOTE — ED NOTES
"Provided pt with orange juice. Pt was encouraged to stay in bed after restless behaviors, repeating \"I want to get out of here\". Curtain pulled back for pt's safety.  "

## 2018-12-09 NOTE — ED NOTES
Pt ambulated to bathroom with assist of one. Gait steady. Pt occasionally reached out to hold items for stability. Pt denied using walker or cane at home.

## 2018-12-10 LAB — INTERPRETATION ECG - MUSE: NORMAL

## 2018-12-11 NOTE — RESULT ENCOUNTER NOTE
Await final culture report per [facility] ED Lab Result protocol.  RN confirmed Patient was prescribed antibiotic from ED visit.

## 2018-12-12 LAB
BACTERIA SPEC CULT: ABNORMAL
BACTERIA SPEC CULT: ABNORMAL
Lab: ABNORMAL
SPECIMEN SOURCE: ABNORMAL

## 2018-12-12 ASSESSMENT — ENCOUNTER SYMPTOMS
NAUSEA: 0
ABDOMINAL PAIN: 0
DIZZINESS: 0
NECK PAIN: 0
WOUND: 0
DIARRHEA: 0
VOMITING: 0
BACK PAIN: 0
HEADACHES: 1

## 2018-12-12 NOTE — RESULT ENCOUNTER NOTE
Final Urine Culture Report on 12/12/18  Emergency Dept discharge antibiotic prescribed: Cephalexin (Keflex) 500 mg capsule, 1 capsule (500 mg) by mouth 2 times daily for 7 days.  #1. Bacteria, >100,000 colonies/mL aerococcus urinae, is SUSCEPTIBLE to Antibiotic.    As per [facility] ED Lab Result protocol, no change in antibiotic therapy.

## 2019-03-05 ENCOUNTER — RECORDS - HEALTHEAST (OUTPATIENT)
Dept: LAB | Facility: CLINIC | Age: 84
End: 2019-03-05

## 2019-03-06 LAB
ANION GAP SERPL CALCULATED.3IONS-SCNC: 9 MMOL/L (ref 5–18)
BUN SERPL-MCNC: 24 MG/DL (ref 8–28)
CALCIUM SERPL-MCNC: 9.4 MG/DL (ref 8.5–10.5)
CHLORIDE BLD-SCNC: 104 MMOL/L (ref 98–107)
CO2 SERPL-SCNC: 29 MMOL/L (ref 22–31)
CREAT SERPL-MCNC: 1.1 MG/DL (ref 0.6–1.1)
GFR SERPL CREATININE-BSD FRML MDRD: 46 ML/MIN/1.73M2
GLUCOSE BLD-MCNC: 88 MG/DL (ref 70–125)
POTASSIUM BLD-SCNC: 3.9 MMOL/L (ref 3.5–5)
SODIUM SERPL-SCNC: 142 MMOL/L (ref 136–145)

## 2019-06-18 ENCOUNTER — RECORDS - HEALTHEAST (OUTPATIENT)
Dept: LAB | Facility: CLINIC | Age: 84
End: 2019-06-18

## 2019-06-18 LAB
ALBUMIN UR-MCNC: NEGATIVE MG/DL
APPEARANCE UR: ABNORMAL
BACTERIA #/AREA URNS HPF: ABNORMAL HPF
BILIRUB UR QL STRIP: NEGATIVE
CAOX CRY #/AREA URNS HPF: PRESENT /[HPF]
COLOR UR AUTO: YELLOW
GLUCOSE UR STRIP-MCNC: NEGATIVE MG/DL
HGB UR QL STRIP: NEGATIVE
HYALINE CASTS #/AREA URNS LPF: ABNORMAL LPF
KETONES UR STRIP-MCNC: NEGATIVE MG/DL
LEUKOCYTE ESTERASE UR QL STRIP: ABNORMAL
MUCOUS THREADS #/AREA URNS LPF: ABNORMAL LPF
NITRATE UR QL: POSITIVE
PH UR STRIP: 6 [PH] (ref 4.5–8)
RBC #/AREA URNS AUTO: ABNORMAL HPF
SP GR UR STRIP: 1.01 (ref 1–1.03)
SQUAMOUS #/AREA URNS AUTO: ABNORMAL LPF
TRANS CELLS #/AREA URNS HPF: ABNORMAL LPF
UROBILINOGEN UR STRIP-ACNC: ABNORMAL
WBC #/AREA URNS AUTO: ABNORMAL HPF

## 2019-06-21 LAB — BACTERIA SPEC CULT: ABNORMAL
